# Patient Record
Sex: FEMALE | Race: WHITE | NOT HISPANIC OR LATINO | URBAN - METROPOLITAN AREA
[De-identification: names, ages, dates, MRNs, and addresses within clinical notes are randomized per-mention and may not be internally consistent; named-entity substitution may affect disease eponyms.]

---

## 2017-02-10 ENCOUNTER — ALLSCRIPTS OFFICE VISIT (OUTPATIENT)
Dept: OTHER | Facility: OTHER | Age: 52
End: 2017-02-10

## 2017-02-16 ENCOUNTER — ALLSCRIPTS OFFICE VISIT (OUTPATIENT)
Dept: OTHER | Facility: OTHER | Age: 52
End: 2017-02-16

## 2017-02-16 ENCOUNTER — LAB REQUISITION (OUTPATIENT)
Dept: LAB | Facility: HOSPITAL | Age: 52
End: 2017-02-16
Payer: COMMERCIAL

## 2017-02-16 DIAGNOSIS — Z01.419 ENCOUNTER FOR GYNECOLOGICAL EXAMINATION WITHOUT ABNORMAL FINDING: ICD-10-CM

## 2017-02-16 DIAGNOSIS — Z11.51 ENCOUNTER FOR SCREENING FOR HUMAN PAPILLOMAVIRUS (HPV): ICD-10-CM

## 2017-02-16 DIAGNOSIS — Z12.31 ENCOUNTER FOR SCREENING MAMMOGRAM FOR MALIGNANT NEOPLASM OF BREAST: ICD-10-CM

## 2017-02-16 PROCEDURE — 87624 HPV HI-RISK TYP POOLED RSLT: CPT | Performed by: PHYSICIAN ASSISTANT

## 2017-02-16 PROCEDURE — G0145 SCR C/V CYTO,THINLAYER,RESCR: HCPCS | Performed by: PHYSICIAN ASSISTANT

## 2017-02-21 LAB — HPV RRNA GENITAL QL NAA+PROBE: ABNORMAL

## 2017-02-23 LAB
LAB AP GYN PRIMARY INTERPRETATION: NORMAL
Lab: NORMAL
PATH INTERP SPEC-IMP: NORMAL

## 2017-02-24 ENCOUNTER — GENERIC CONVERSION - ENCOUNTER (OUTPATIENT)
Dept: OTHER | Facility: OTHER | Age: 52
End: 2017-02-24

## 2017-02-28 ENCOUNTER — ALLSCRIPTS OFFICE VISIT (OUTPATIENT)
Dept: OTHER | Facility: OTHER | Age: 52
End: 2017-02-28

## 2017-03-01 ENCOUNTER — ALLSCRIPTS OFFICE VISIT (OUTPATIENT)
Dept: OTHER | Facility: OTHER | Age: 52
End: 2017-03-01

## 2017-03-01 PROCEDURE — 88305 TISSUE EXAM BY PATHOLOGIST: CPT | Performed by: OBSTETRICS & GYNECOLOGY

## 2017-03-02 ENCOUNTER — LAB REQUISITION (OUTPATIENT)
Dept: LAB | Facility: HOSPITAL | Age: 52
End: 2017-03-02
Payer: COMMERCIAL

## 2017-03-02 DIAGNOSIS — A63.0 ANOGENITAL (VENEREAL) WARTS: ICD-10-CM

## 2017-03-02 DIAGNOSIS — R87.612 LOW GRADE SQUAMOUS INTRAEPITHELIAL LESION ON CYTOLOGIC SMEAR OF CERVIX (LGSIL): ICD-10-CM

## 2017-11-06 ENCOUNTER — ALLSCRIPTS OFFICE VISIT (OUTPATIENT)
Dept: OTHER | Facility: OTHER | Age: 52
End: 2017-11-06

## 2017-11-06 DIAGNOSIS — E78.5 HYPERLIPIDEMIA: ICD-10-CM

## 2017-11-06 DIAGNOSIS — R73.01 IMPAIRED FASTING GLUCOSE: ICD-10-CM

## 2017-11-06 DIAGNOSIS — Z12.31 ENCOUNTER FOR SCREENING MAMMOGRAM FOR MALIGNANT NEOPLASM OF BREAST: ICD-10-CM

## 2017-11-06 DIAGNOSIS — E55.9 VITAMIN D DEFICIENCY: ICD-10-CM

## 2017-11-07 NOTE — PROGRESS NOTES
Assessment  1  Acute non-recurrent maxillary sinusitis (461 0) (J01 00)   2  Asthma (493 90) (J45 909)   3  Hyperlipidemia (272 4) (E78 5)   4  IFG (impaired fasting glucose) (790 21) (R73 01)   5  Encounter for screening mammogram for malignant neoplasm of breast (V76 12)   (Z12 31)    Plan  Acute non-recurrent maxillary sinusitis, Asthma    · Amoxicillin-Pot Clavulanate 875-125 MG Oral Tablet (Augmentin); TAKE 1 TABLET  EVERY 12 HOURS WITH MEALS UNTIL GONE   · PredniSONE 10 MG Oral Tablet; take 4 tablets po for 4 days, three tablets for 3  days, 2 tablet for 2 days, 1 tablet for 1 day  Asthma    · Advair Diskus 250-50 MCG/DOSE Inhalation Aerosol Powder Breath Activated; Inhale 1 puff twice daily  Encounter for screening mammogram for malignant neoplasm of breast    · * MAMMO SCREENING BILATERAL W CAD; Status:Hold For - Scheduling; Requested  GMM:98SLH7284;   Hyperlipidemia, IFG (impaired fasting glucose), Vitamin D deficiency    · (1) COMPREHENSIVE METABOLIC PANEL; Status:Active; Requested RGV:40QZQ7417;    · (1) HEMOGLOBIN A1C; Status:Active; Requested FMF:21JGR1439;    · (1) LIPID PANEL FASTING W DIRECT LDL REFLEX; Status:Active; Requested  QFP:27CTI6919;    · (1) TSH WITH FT4 REFLEX; Status:Active; Requested DELORES:93GHU4899;    · (1) VITAMIN D 25-HYDROXY; Status:Active; Requested UGS:[id number];     Chief Complaint  1  Cold Symptoms  Patient here with stuffy head post nasal drip cough green yellow mucous sinus pressure teeth are sensitive left ear crackling fatigue      History of Present Illness  HPI: started 2-3 weeks   Cold Symptoms: Joannie Monday presents with complaints of cold symptoms  Associated symptoms include nasal congestion,-- runny nose,-- sore throat,-- hoarseness,-- productive cough,-- facial pressure,-- facial pain,-- headache,-- plugged ear(s),-- wheezing,-- shortness of breath-- and-- fatigue, but-- no ear pain,-- no nausea,-- no vomiting,-- no diarrhea,-- no fever-- and-- no chills     Asthma (Follow-Up): The patient is being seen for an acute exacerbation of asthma  The patient's long-term asthma pattern has been classified as moderate persistent  The patient states she has been doing poorly with her asthma control since the last visit  She has no significant interval events  Asthma Control: Not well controlled  Interval Symptoms:   worsened wheezing-- and-- worsened coughing  Medications: the patient is adherent with her medication regimen  Hyperlipidemia (Follow-Up): The patient states her hyperlipidemia has been stable since the last visit  She has no significant interval events  Symptoms: The patient is currently asymptomatic  Medications: the patient is adherent with her medication regimen  Review of Systems    Constitutional: as noted in HPI    ENT: as noted in HPI  Cardiovascular: as noted in HPI  Respiratory: as noted in HPI  Breasts: no complaints of breast pain, breast lump or nipple discharge  Gastrointestinal: as noted in HPI  Genitourinary: as noted in HPI  Musculoskeletal: as noted in HPI  Integumentary: no complaints of skin rash or lesion, no itching or dry skin, no skin wounds  Neurological: no complaints of headache, no confusion, no numbness or tingling, no dizziness or fainting  Active Problems  1  Asthma (493 90) (J45 909)   2  Carpal tunnel syndrome, unspecified laterality (354 0) (G56 00)   3  AUBREY I (cervical intraepithelial neoplasia I) (622 11) (N87 0)   4  Depression screen (V79 0) (Z13 89)   5  Encounter for gynecological examination without abnormal finding (V72 31) (Z01 419)   6  Encounter for IUD removal (V25 12) (Z30 432)   7  Encounter for screening colonoscopy (V76 51) (Z12 11)   8  Encounter for screening mammogram for malignant neoplasm of breast (V76 12)   (Z12 31)   9  Esophageal reflux (530 81) (K21 9)   10  High risk HPV infection (079 4) (B97 7)   11  History of allergy (V15 09) (Z88 9)   12   Hyperlipidemia (272 4) (E78 5) 13  IFG (impaired fasting glucose) (790 21) (R73 01)   14  Insomnia (780 52) (G47 00)   15  Low grade squamous intraepithelial lesion (LGSIL) on cervical Pap smear (795 03)    (R87 612)   16  Ptosis of eyelid (374 30) (H02 409)   17  Screening for HPV (human papillomavirus) (V73 81) (Z11 51)   18  Vitamin D deficiency (268 9) (E55 9)    Past Medical History  1  History of Asthma (493 90) (J45 909)   2  History of Dysfunction of Eustachian tube, unspecified laterality (381 81) (H69 80)   3  History of Exposure to STD (V01 6) (Z20 2)   4  History of Hand pain, unspecified laterality   5  History Of 1  Previous Pregnancies (V61 5)   6  History of Lump In The Throat (784 2)   7  History of Muscle spasms of neck (728 85) (M62 838)   8  History of Numbness (782 0) (R20 0)   9  History of Screening for HPV (human papillomavirus) (V73 81) (Z11 51)   10  History of Screening for human papillomavirus (HPV) (V73 81) (Z11 51)   11  History of Shoulder joint pain, unspecified laterality   12  History of Tinea versicolor (111 0) (B36 0)   13  History of TMJ arthralgia (524 62) (M26 629)  Active Problems And Past Medical History Reviewed: The active problems and past medical history were reviewed and updated today  Family History  Mother    1  Family history of myocardial infarction (V17 3) (Z82 49)   2  Family history of Mother  At Age 72  Father    3  Family history of myocardial infarction (V17 3) (Z82 49)   4  Family history of Father  At Age 62  Sister    11  Family history of diabetes mellitus (V18 0) (Z83 3)   6  Family history of Pure Hypercholesterolemia  Brother    7  Family history of coronary artery disease (V17 3) (Z82 49)  Family History Reviewed: The family history was reviewed and updated today  Social History   · Being A Social Drinker   · Daily Coffee Consumption (2  Cups/Day)   · Never a smoker  The social history was reviewed and updated today  Surgical History  1   History of  Section   2  History of Colposcopy Cervix With Biopsy(S) With Endocervical Curettage   3  History of Corneal LASIK Bilateral   4  History of Neuroplasty Decompression Median Nerve At Carpal Tunnel  Surgical History Reviewed: The surgical history was reviewed and updated today  Current Meds   1  Advair Diskus 250-50 MCG/DOSE Inhalation Aerosol Powder Breath Activated; Inhale 1   puff twice daily; Therapy: 30HEX6143 to (Evaluate:2017)  Requested for: 16VYA6363; Last   Rx:26Ijf0477 Ordered   2  Ventolin  (90 Base) MCG/ACT Inhalation Aerosol Solution; INHALE 2 PUFFS   FOUR TIMES DAILY AS DIRECTED; Therapy: 29LRT1879 to (Last Rx:77Jkx9854)  Requested for: 10WIC9067 Ordered    The medication list was reviewed and updated today  Allergies  1  No Known Drug Allergies  2  No Known Food Allergies   3  Seasonal    Vitals   Recorded: 82IWD1986 10:50AM   Temperature 97 8 F   Heart Rate 74   Respiration 18   Systolic 072   Diastolic 80   Height 4 ft 11 5 in   Weight 139 lb 8 oz   BMI Calculated 27 7   BSA Calculated 1 59     Physical Exam    Constitutional   General appearance: No acute distress, well appearing and well nourished  Eyes   Conjunctiva and lids: No swelling, erythema or discharge  Ears, Nose, Mouth, and Throat   External inspection of ears and nose: Normal     Otoscopic examination: Tympanic membranes translucent with normal light reflex  Canals patent without erythema  Nasal mucosa, septum, and turbinates: Normal without edema or erythema  Oropharynx: Abnormal  -- pnd  Pulmonary   Respiratory effort: No increased work of breathing or signs of respiratory distress  Auscultation of lungs: Abnormal   Auscultation of the lungs revealed diffuse wheezing  Cardiovascular   Auscultation of heart: Normal rate and rhythm, normal S1 and S2, without murmurs  Examination of extremities for edema and/or varicosities: Normal     Abdomen   Abdomen: Non-tender, no masses  Liver and spleen: No hepatomegaly or splenomegaly  Lymphatic   Palpation of lymph nodes in neck: No lymphadenopathy  Musculoskeletal   Gait and station: Normal     Skin   Skin and subcutaneous tissue: Normal without rashes or lesions           Future Appointments    Date/Time Provider Specialty Site   02/20/2018 10:00 AM [facility]etrics[facility]Gynecology [facility]     Signatures   Electronically signed by : Ana Thibodeaux DO; Nov 6 2017 11:07AM EST                       (Author)

## 2018-01-09 NOTE — MISCELLANEOUS
Provider Comments  Provider Comments:   Our records indicate that you missed an appointment on 11/08/2016  If you have not done so already, please call our office and we will be happy to schedule another appointment for you  If you must cancel your appointment, our office policy requires you to call our office at least 24 hours in advance so that we may utilize your appointment time for another patient who requires our services  If you have any questions, please contact our office at [phone]        Signatures   Electronically signed by :  Oscar Nassar, ; Nov 11 2016 10:42AM EST                       (Author)

## 2018-01-10 NOTE — PSYCH
History of Present Illness  Psychotherapy Provided [facility]: Individual Psychotherapy 30 minutes minutes provided today  Goals addressed in session:   Patient increasingly anxious aand overwhelmed with financial and potential housing issues  Son nd his girlfriend living with her and not contributing  Feels meds have not been helpful but given level of her stressors it is not surprising  HPI - Psych: Patient detail continued issues with anxiety and mood  Working three jobs and dealing with lack of respect or support from son and girlfriend  May lose home and has limited options to address this issue  Denies any SI   Note   Note:   Reviewed coping strategies for mood and anxiety issues as well as need to hold son accountable  Provided number for [facility] for advocacy/referral to address home and financial issues if possible  Offered to see patient on PRN basis/      Assessment    1   Depression with anxiety (300 4) (F41 8)    Signatures   Electronically signed by : Tashia Kunz LCSW; Feb 28 2017 12:59PM EST                       (Author)

## 2018-01-12 NOTE — PROGRESS NOTES
Assessment    1  Encounter for preventive health examination (V70 0) (Z00 00)   2  Blood pressure elevated (401 9) (I10)   3  Hyperlipidemia (272 4) (E78 5)   4  IFG (impaired fasting glucose) (790 21) (R73 01)   5  Vitamin D deficiency (268 9) (E55 9)   6  Never a smoker    Plan  Asthma    · Advair Diskus 250-50 MCG/DOSE Inhalation Aerosol Powder Breath Activated; Inhale 1 puff twice daily   · Ventolin  (90 Base) MCG/ACT Inhalation Aerosol Solution; INHALE 2  PUFFS FOUR TIMES DAILY AS DIRECTED  Blood pressure elevated, Hyperlipidemia, IFG (impaired fasting glucose), Vitamin D  deficiency    · (1) COMPREHENSIVE METABOLIC PANEL; Status:Active; Requested for:67Wjn9098;    · (1) HEMOGLOBIN A1C; Status:Active; Requested for:14Nwx1798;    · (1) LIPID PANEL, FASTING; Status:Active; Requested for:83Qos6366;    · (1) VITAMIN D 25-HYDROXY; Status:Active; Requested for:04Xnj6084;    Health Maintenance    · Always use a seat belt and shoulder strap when riding or driving a motor vehicle ;  Status:Complete;   Done: 61FIB5994   · Begin a limited exercise program ; Status:Complete;   Done: 93DPE7892   · Brush your teeth freq1 and floss at least once a day ; Status:Complete;   Done:  83DDO5712   · Drink plenty of fluids ; Status:Complete;   Done: 64FSD0601   · Eat a low fat and low cholesterol diet ; Status:Complete;   Done: 17JWG4411   · Use a sun block product with an SPF of 15 or more ; Status:Complete;   Done:  14LVG5818   · We recommend routine visits to a dentist ; Status:Complete;   Done: 18HSF6227   · We recommend that you get 400 IU of Vitamin D each day ; Status:Complete;   Done:  69YJC1452   · Call [phone] if: You find a new or different kind of lump in your breast ;  Status:Complete;   Done: 23QPM8496   · Call [phone] if: You have any bleeding from the vagina ; Status:Complete;    Done: 43YOP2412  Health Maintenance, Encounter for screening mammogram for malignant neoplasm of  breast    · * MAMMO SCREENING BILATERAL W CAD; Status:Hold For - Scheduling; Requested  for:54Ndv2470;     depression doing well without taking any medications at this time  will continue to monitor     Discussion/Summary  health maintenance visit Currently, she eats an adequate diet  Chief Complaint  Patient here for annual wellness exam      History of Present Illness  HM, Adult Female: The patient is being seen for a health maintenance evaluation  The last health maintenance visit was 2 year(s) ago  General Health: The patient's health since the last visit is described as good  She has regular dental visits  She denies vision problems  Vision care includes wearing reading glasses and previous LASIK eye surgery  She denies hearing loss  Immunizations status: up to date  Lifestyle:  She consumes a diverse and healthy diet  She does not have any weight concerns  She exercises regularly  She does not use tobacco  She consumes alcohol  She denies drug use  Reproductive health:  she reports normal menses  she uses contraception  For contraception, she has an intrauterine device  she is sexually active  pregnancy history: G 1P 1  Screening: cancer screening reviewed and updated  metabolic screening reviewed and updated  risk screening reviewed and updated  Review of Systems    Constitutional: No fever, no chills, feels well, no tiredness, no recent weight gain or weight loss  Eyes: No complaints of eye pain, no red eyes, no eyesight problems, no discharge, no dry eyes, no itching of eyes  ENT: no complaints of earache, no loss of hearing, no nose bleeds, no nasal discharge, no sore throat, no hoarseness  Cardiovascular: No complaints of slow heart rate, no fast heart rate, no chest pain, no palpitations, no leg claudication, no lower extremity edema  Respiratory: No complaints of shortness of breath, no wheezing, no cough, no SOB on exertion, no orthopnea, no PND     Gastrointestinal: No complaints of abdominal pain, no constipation, no nausea or vomiting, no diarrhea, no bloody stools  Genitourinary: No complaints of dysuria, no incontinence, no pelvic pain, no dysmenorrhea, no vaginal discharge or bleeding  Musculoskeletal: No complaints of arthralgias, no myalgias, no joint swelling or stiffness, no limb pain or swelling  Integumentary: No complaints of skin rash or lesions, no itching, no skin wounds, no breast pain or lump  Neurological: No complaints of headache, no confusion, no convulsions, no numbness, no dizziness or fainting, no tingling, no limb weakness, no difficulty walking  Psychiatric: Not suicidal, no sleep disturbance, no anxiety or depression, no change in personality, no emotional problems  Endocrine: No complaints of proptosis, no hot flashes, no muscle weakness, no deepening of the voice, no feelings of weakness  Hematologic/Lymphatic: No complaints of swollen glands, no swollen glands in the neck, does not bleed easily, does not bruise easily  Active Problems    1  Ankle injury (959 7) (S99 919A)   2  Asthma (493 90) (J45 909)   3  Blood pressure elevated (401 9) (I10)   4  Carpal tunnel syndrome, unspecified laterality (354 0) (G56 00)   5  Depression with anxiety (300 4) (F41 8)   6  Encounter for routine gynecological examination (V72 31) (Z01 419)   7  Encounter for routine gynecological examination with Papanicolaou smear of cervix   (V72 31,V76 2) (Z01 419)   8  Encounter for screening colonoscopy (V76 51) (Z12 11)   9  Encounter for screening mammogram for malignant neoplasm of breast (V76 12)   (Z12 31)   10  Esophageal reflux (530 81) (K21 9)   11  Headache (784 0) (R51)   12  History of allergy (V15 09) (Z88 9)   13  Hyperlipidemia (272 4) (E78 5)   14  IFG (impaired fasting glucose) (790 21) (R73 01)   15  Insomnia (780 52) (G47 00)   16  Ptosis of eyelid (374 30) (H02 409)   17  Screening for HPV (human papillomavirus) (V73 81) (Z11 51)   18   Vitamin D deficiency (268 9) (E55 9)    Past Medical History    · History of Asthma (493 90) (J45 909)   · History of Dysfunction of eustachian tube, unspecified laterality (381 81) (H69 80)   · History of Exposure to STD (V01 6) (Z20 2)   · History of Hand pain, unspecified laterality   · History Of 1  Previous Pregnancies (V61 5)   · History of Lump In The Throat (784 2)   · History of Muscle spasms of neck (728 85) (M62 838)   · History of Numbness (782 0) (R20 0)   · History of Screening for human papillomavirus (HPV) (V73 81) (Z11 51)   · History of Shoulder joint pain, unspecified laterality   · History of Tinea versicolor (111 0) (B36 0)   · History of TMJ arthralgia (524 62) (M26 62)    Surgical History    · History of  Section   · History of Corneal LASIK Bilateral    Family History  Mother    · Family history of Mother  At Age 72  Father    · Family history of Father  At Age 62  Sister    · Family history of Pure Hypercholesterolemia  Family History    · Family history of Coronary Artery Disease (V17 49)    Social History    · Being A Social Drinker   · Daily Coffee Consumption (2  Cups/Day)   · Never a smoker    Current Meds   1  Advair Diskus 250-50 MCG/DOSE Inhalation Aerosol Powder Breath Activated; USE   ONE INHALATION TWO TIMES A DAY; Therapy: 36AXU7188 to (Evaluate:20Brv7518)  Requested for: 89Yhx6957; Last   Rx:54Lso3693 Ordered   2  Mirena 20 MCG/24HR Intrauterine Intrauterine Device; USE AS DIRECTED; Therapy: 57SZV0065 to Recorded   3  Ventolin  (90 Base) MCG/ACT Inhalation Aerosol Solution; INHALE 2 PUFFS 4   TIMES DAILY AS NEEDED; Therapy: 61VVQ4996 to (Last Rx:34Jko4371)  Requested for: 74XZB8879 Ordered    Allergies    1  No Known Drug Allergies    2  No Known Food Allergies   3   Seasonal    Vitals   Recorded: 38BGA1171 56:25YS   Systolic 155   Diastolic 82   Heart Rate 52   Respiration 16   Height 5 ft 0 24 in   Weight 133 lb 4 oz   BMI Calculated 25 82   BSA Calculated 1 58 Physical Exam    Constitutional   General appearance: No acute distress, well appearing and well nourished  Head and Face   Head and face: Normal     Palpation of the face and sinuses: No sinus tenderness  Eyes   Conjunctiva and lids: No swelling, erythema or discharge  Pupils and irises: Equal, round, reactive to light  Ophthalmoscopic examination: Normal fundi and optic discs  Ears, Nose, Mouth, and Throat   External inspection of ears and nose: Normal     Otoscopic examination: Tympanic membranes translucent with normal light reflex  Canals patent without erythema  Hearing: Normal     Nasal mucosa, septum, and turbinates: Normal without edema or erythema  Lips, teeth, and gums: Normal, good dentition  Oropharynx: Normal with no erythema, edema, exudate or lesions  Neck   Neck: Supple, symmetric, trachea midline, no masses  Thyroid: Normal, no thyromegaly  Pulmonary   Respiratory effort: No increased work of breathing or signs of respiratory distress  Percussion of chest: Normal     Auscultation of lungs: Clear to auscultation  Cardiovascular   Palpation of heart: Normal PMI, no thrills  Auscultation of heart: Normal rate and rhythm, normal S1 and S2, no murmurs  Examination of extremities for edema and/or varicosities: Normal     Chest   Chest: Normal     Abdomen   Abdomen: Non-tender, no masses  Liver and spleen: No hepatomegaly or splenomegaly  Examination for hernias: No hernia appreciated  Lymphatic   Palpation of lymph nodes in neck: No lymphadenopathy  Palpation of lymph nodes in axillae: No lymphadenopathy  Palpation of lymph nodes in groin: No lymphadenopathy  Musculoskeletal   Gait and station: Normal     Digits and nails: Normal without clubbing or cyanosis      Joints, bones, and muscles: Normal     Range of motion: Normal     Stability: Normal     Muscle strength/tone: Normal     Skin   Skin and subcutaneous tissue: Normal without rashes or lesions  Palpation of skin and subcutaneous tissue: Normal turgor  Neurologic   Cranial nerves: Cranial nerves II-XII intact  Cortical function: Normal mental status  Reflexes: 2+ and symmetric  Sensation: No sensory loss  Coordination: Normal finger to nose and heel to shin  Psychiatric   Judgment and insight: Normal     Orientation to person, place, and time: Normal     Recent and remote memory: Intact  Mood and affect: Normal        Results/Data  PHQ-9 Adult Depression Screening 65Nkw2638 02:14PM User, [employer]     Test Name Result Flag Reference   PHQ-9 Adult Depression Score 22     Over the last two weeks, how often have you been bothered by any of the following problems? Little interest or pleasure in doing things: More than half the days - 2  Feeling down, depressed, or hopeless: Nearly every day - 3  Trouble falling or staying asleep, or sleeping too much: Nearly every day - 3  Feeling tired or having little energy: Nearly every day - 3  Poor appetite or over eating: Nearly every day - 3  Feeling bad about yourself - or that you are a failure or have let yourself or your family down: Nearly every day - 3  Trouble concentrating on things, such as reading the newspaper or watching television: Several days - 1  Moving or speaking so slowly that other people could have noticed  Or the opposite -  being so fidgety or restless that you have been moving around a lot more than usual: Nearly every day - 3  Thoughts that you would be better off dead, or of hurting yourself in some way: Several days - 1   PHQ-9 Adult Depression Screening Positive     PHQ-9 Difficulty Level Not difficult at all     PHQ-9 Severity Severe Depression         Health Management  Encounter for routine gynecological examination   BREAST EXAM; every 1 year; Next Due: 21Nov2013; Overdue  PELVIC EXAM; every 1 year; Next Due: 21Nov2013;  Overdue    Future Appointments    Date/Time Provider Specialty Site   11/08/2016 09:30 AM Cherie Ortiz,  Family Medicine [facility] [address]   Electronically signed by : Ashia Holt MD; Aug  8 2016  3:10PM EST                       (Author)

## 2018-01-12 NOTE — RESULT NOTES
Verified Results  (1) LIPID PANEL, FASTING 09Aug2016 03:32PM Pau Stephens     Test Name Result Flag Reference   CHOLESTEROL, TOTAL 218 mg/dL H 125-200   HDL CHOLESTEROL 51 mg/dL  > OR = 46   TRIGLICERIDES 556 mg/dL  <150   LDL-CHOLESTEROL 146 mg/dL (calc) H <130   Desirable range <100 mg/dL for patients with CHD or  diabetes and <70 mg/dL for diabetic patients with  known heart disease  CHOL/HDLC RATIO 4 3 (calc)  < OR = 5 0   NON HDL CHOLESTEROL 167 mg/dL (calc) H    Target for non-HDL cholesterol is 30 mg/dL higher than   LDL cholesterol target  (1) COMPREHENSIVE METABOLIC PANEL [id number] 80:05LQ Pau Stephens     Test Name Result Flag Reference   GLUCOSE 84 mg/dL  65-99   Fasting reference interval   UREA NITROGEN (BUN) 12 mg/dL  7-25   CREATININE 0 87 mg/dL  0 50-1 05   For patients >52years of age, the reference limit  for Creatinine is approximately 13% higher for people  identified as -American  eGFR NON-AFR   AMERICAN 77 mL/min/1 73m2  > OR = 60   eGFR AFRICAN AMERICAN 89 mL/min/1 73m2  > OR = 60   BUN/CREATININE RATIO   3-83   NOT APPLICABLE (calc)   SODIUM 138 mmol/L  135-146   POTASSIUM 4 2 mmol/L  3 5-5 3   CHLORIDE 102 mmol/L     CARBON DIOXIDE 26 mmol/L  20-31   CALCIUM 9 8 mg/dL  8 6-10 4   PROTEIN, TOTAL 7 1 g/dL  6 1-8 1   ALBUMIN 4 6 g/dL  3 6-5 1   GLOBULIN 2 5 g/dL (calc)  1 9-3 7   ALBUMIN/GLOBULIN RATIO 1 8 (calc)  1 0-2 5   BILIRUBIN, TOTAL 1 2 mg/dL  0 2-1 2   ALKALINE PHOSPHATASE 116 U/L     AST 15 U/L  10-35   ALT 9 U/L  6-29     *(Q) VITAMIN D, 25-HYDROXY, LC/MS/MS 09Aug2016 03:32PPau Lazar     Test Name Result Flag Reference   VITAMIN D, 25-OH, TOTAL 52 ng/mL     Vitamin D Status         25-OH Vitamin D:     Deficiency:                    <20 ng/mL  Insufficiency:             20 - 29 ng/mL  Optimal:                 > or = 30 ng/mL     For 25-OH Vitamin D testing on patients on   D2-supplementation and patients for whom quantitation   of D2 and D3 fractions is required, the QuestAssureD(TM)  25-OH VIT D, (D2,D3), LC/MS/MS is recommended: order   code 95014 (patients >2yrs)  For more information on this test, go to:  http://education  ReferStar/faq/EIH115  (This link is being provided for   informational/educational purposes only )     (Q) HEMOGLOBIN A1c [id number] 03:32PM Pau Stephens   REPORT COMMENT:  FASTING:YES     Test Name Result Flag Reference   HEMOGLOBIN A1c 6 1 % of total Hgb H <5 7   According to ADA guidelines, hemoglobin A1c <7 0%  represents optimal control in non-pregnant diabetic  patients  Different metrics may apply to specific  patient populations  Standards of Medical Care in  [phone]  Diabetes Care  2013;36:s11-s66     For the purpose of screening for the presence of  diabetes  <5 7%       Consistent with the absence of diabetes  5 7-6 4%    Consistent with increased risk for diabetes              (prediabetes)  >or=6 5%    Consistent with diabetes     This assay result is consistent with a higher risk  of diabetes  Currently, no consensus exists for use of hemoglobin  A1c for diagnosis of diabetes for children         Discussion/Summary   bad cholesterol LDL is high   watch sweets and fatty food   blood sugars are better but still in Pre-diabetic range   - Dr Ray Freeman

## 2018-01-13 VITALS
SYSTOLIC BLOOD PRESSURE: 118 MMHG | HEIGHT: 60 IN | WEIGHT: 128 LBS | BODY MASS INDEX: 25.13 KG/M2 | DIASTOLIC BLOOD PRESSURE: 72 MMHG

## 2018-01-13 VITALS
BODY MASS INDEX: 27.39 KG/M2 | HEART RATE: 74 BPM | TEMPERATURE: 97.8 F | SYSTOLIC BLOOD PRESSURE: 122 MMHG | DIASTOLIC BLOOD PRESSURE: 80 MMHG | HEIGHT: 60 IN | WEIGHT: 139.5 LBS | RESPIRATION RATE: 18 BRPM

## 2018-01-14 VITALS
SYSTOLIC BLOOD PRESSURE: 126 MMHG | BODY MASS INDEX: 25.64 KG/M2 | WEIGHT: 130.6 LBS | DIASTOLIC BLOOD PRESSURE: 76 MMHG | RESPIRATION RATE: 16 BRPM | HEIGHT: 60 IN | HEART RATE: 76 BPM

## 2018-01-14 VITALS
SYSTOLIC BLOOD PRESSURE: 120 MMHG | DIASTOLIC BLOOD PRESSURE: 74 MMHG | HEIGHT: 60 IN | WEIGHT: 124 LBS | BODY MASS INDEX: 24.35 KG/M2

## 2018-01-14 NOTE — MISCELLANEOUS
Message  Return to work or school:   Letty Root is under my professional care  She was seen in my office on 11/06/2017 11/09/2017      PATIENT WAS SEEN IN OUR OFFICE 11/06/2017 YESSICA RETURN TO WORK 11/09/2017  DR Walter Irizarry / Jimmie Khan  Signatures   Electronically signed by :  Drew Sarabia, ; Nov 6 2017 11:16AM EST                       (Author)

## 2018-01-17 NOTE — MISCELLANEOUS
Message   Recorded as Task   Date: 02/24/2017 08:25 AM, Created By: Jhonny Pace   Task Name: Go to Result   Assigned To: Will Angeles   Regarding Patient: Shaniqua Higgins, Status: In Progress   Comment:    Linda Esparza - 24 Feb 2017 8:25 AM     TASK CREATED  Pap LGSIL, positive HPV 18  Needs colpo  Please let her know and schedule  Ami Kaylyn Donald - 24 Feb 2017 8:54 AM     TASK IN PROGRESS   Kaylyn Wan - 24 Feb 2017 9:14 AM     TASK EDITED  explained pap to pt  colpo sched  03/01 with vg        Active Problems    1  Asthma (493 90) (J45 909)   2  Carpal tunnel syndrome, unspecified laterality (354 0) (G56 00)   3  Depression screen (V79 0) (Z13 89)   4  Depression with anxiety (300 4) (F41 8)   5  Encounter for gynecological examination without abnormal finding (V72 31) (Z01 419)   6  Encounter for IUD removal (V25 12) (Z30 432)   7  Encounter for screening colonoscopy (V76 51) (Z12 11)   8  Encounter for screening mammogram for malignant neoplasm of breast (V76 12)   (Z12 31)   9  Esophageal reflux (530 81) (K21 9)   10  Headache (784 0) (R51)   11  History of allergy (V15 09) (Z88 9)   12  Hyperlipidemia (272 4) (E78 5)   13  IFG (impaired fasting glucose) (790 21) (R73 01)   14  Insomnia (780 52) (G47 00)   15  Ptosis of eyelid (374 30) (H02 409)   16  Screening for HPV (human papillomavirus) (V73 81) (Z11 51)   17  Vitamin D deficiency (268 9) (E55 9)    Current Meds   1  Advair Diskus 250-50 MCG/DOSE Inhalation Aerosol Powder Breath Activated; Inhale 1   puff twice daily; Therapy: 73IJD4893 to (Evaluate:94Ncg9356)  Requested for: 24AZQ7868; Last   Rx:51Wlk0997 Ordered   2  BusPIRone HCl - 7 5 MG Oral Tablet; take 1 tablet every twelve hours; Therapy: 09GOX1294 to (Evaluate:52Chx3782)  Requested for: 97DMZ4016; Last   Rx:82Pdd6324 Ordered   3  HydrOXYzine HCl - 50 MG Oral Tablet; TAKE 1 TABLET AT BEDTIME;    Therapy: 81LKM8787 to (Misbah Zendejas)  Requested for: 89DLA9747; Last   Rx:33Nfr7429 Ordered   4  Mirena (52 MG) 20 MCG/24HR Intrauterine Intrauterine Device; USE AS DIRECTED; Therapy: 84KFA2377 to Recorded   5  Promethazine-DM 6 25-15 MG/5ML Oral Syrup; TAKE 5 ML EVERY 4 TO 6 HOURS AS   NEEDED FOR COUGH; Therapy: 80XDZ4185 to (Evaluate:36Oir9922)  Requested for: 68PWW8647; Last   Rx:75Urq9790 Ordered   6  Sertraline HCl - 50 MG Oral Tablet; take 1 tablet by mouth daily as directed; Therapy: 71PZO5385 to (Evaluate:63Tmc2711)  Requested for: 73ZLM8094; Last   Rx:10Feb2017 Ordered   7  Ventolin  (90 Base) MCG/ACT Inhalation Aerosol Solution; INHALE 2 PUFFS   FOUR TIMES DAILY AS DIRECTED; Therapy: 55ZMM4656 to (Last Rx:10Feb2017)  Requested for: 42ORP1880 Ordered    Allergies    1  No Known Drug Allergies    2  No Known Food Allergies   3   Seasonal    Signatures   Electronically signed by : Eriberto Mcmanus, ; Feb 24 2017  9:14AM EST                       (Author)

## 2018-02-07 PROBLEM — R87.612 LOW GRADE SQUAMOUS INTRAEPITHELIAL LESION (LGSIL) ON CERVICAL PAP SMEAR: Status: ACTIVE | Noted: 2017-03-01

## 2018-02-07 PROBLEM — B97.7 HIGH RISK HPV INFECTION: Status: ACTIVE | Noted: 2017-03-01

## 2018-02-07 PROBLEM — N87.0 CIN I (CERVICAL INTRAEPITHELIAL NEOPLASIA I): Status: ACTIVE | Noted: 2017-03-09

## 2018-02-07 RX ORDER — ALBUTEROL SULFATE 90 UG/1
2 AEROSOL, METERED RESPIRATORY (INHALATION) 4 TIMES DAILY PRN
COMMUNITY
Start: 2014-07-14 | End: 2018-10-30 | Stop reason: ALTCHOICE

## 2018-02-07 RX ORDER — BUDESONIDE AND FORMOTEROL FUMARATE DIHYDRATE 80; 4.5 UG/1; UG/1
2 AEROSOL RESPIRATORY (INHALATION) 2 TIMES DAILY
COMMUNITY
Start: 2017-11-07 | End: 2018-10-30 | Stop reason: ALTCHOICE

## 2018-02-19 ENCOUNTER — TELEPHONE (OUTPATIENT)
Dept: OBGYN CLINIC | Facility: CLINIC | Age: 53
End: 2018-02-19

## 2018-10-30 ENCOUNTER — OFFICE VISIT (OUTPATIENT)
Dept: FAMILY MEDICINE CLINIC | Facility: CLINIC | Age: 53
End: 2018-10-30

## 2018-10-30 VITALS
SYSTOLIC BLOOD PRESSURE: 122 MMHG | BODY MASS INDEX: 29.35 KG/M2 | WEIGHT: 147.8 LBS | DIASTOLIC BLOOD PRESSURE: 76 MMHG | HEART RATE: 70 BPM | RESPIRATION RATE: 16 BRPM | OXYGEN SATURATION: 98 % | TEMPERATURE: 98.6 F

## 2018-10-30 DIAGNOSIS — J45.40 MODERATE PERSISTENT ASTHMA WITHOUT COMPLICATION: Primary | Chronic | ICD-10-CM

## 2018-10-30 DIAGNOSIS — F41.9 ANXIETY: Chronic | ICD-10-CM

## 2018-10-30 PROCEDURE — 99212 OFFICE O/P EST SF 10 MIN: CPT | Performed by: FAMILY MEDICINE

## 2018-10-30 RX ORDER — ALBUTEROL SULFATE 90 UG/1
2 AEROSOL, METERED RESPIRATORY (INHALATION) EVERY 6 HOURS PRN
Qty: 18 G | Refills: 5 | Status: SHIPPED | OUTPATIENT
Start: 2018-10-30 | End: 2019-08-29 | Stop reason: SDUPTHER

## 2018-10-30 NOTE — PROGRESS NOTES
Assessment/Plan:    No problem-specific Assessment & Plan notes found for this encounter  Diagnoses and all orders for this visit:    Moderate persistent asthma without complication  Comments:  Stable at this time  PRN Albuterol HFA refilled  Pt given samples of Symbicort 80/4 5mcg 2 puffs BID today  Pt urged to f/u with someone, in next 2 - 3 months  Orders:  -     albuterol (VENTOLIN HFA) 90 mcg/act inhaler; Inhale 2 puffs every 6 (six) hours as needed for wheezing    Anxiety  Comments:  Starting pt back on Sertraline - also referred to [facility] Xiao  Pt urged to f/u with a provider in the next 2 - 3 months  Disc potential R/SE of med  Orders:  -     sertraline (ZOLOFT) 50 mg tablet; Take 1 tablet (50 mg total) by mouth daily  -     Ambulatory referral to [facility] Xiao; Future          Subjective:      Patient ID: Richard Mohr is a 46 y o  female  Pt has not filled her asthma meds in about a year  Cost has been an issue for her, as she does not have health insurance  Pt agitated and upset coming into exam room today, and on evaluation  "[facility] fired me 2 years ago," the pt reported  She also went on to say today, "I think I'm leaving this practice "  She has an ongoing cough, no fevers, no sinus pain  Her anxiety has also been bad for her - on a daily basis  No HI/SI  She is interested in therapy / counseling  Reports doing well with / tolerating Sertraline in the past         The following portions of the patient's history were reviewed and updated as appropriate: allergies, current medications, past family history, past social history, past surgical history and problem list     Review of Systems   Constitutional: Negative for activity change and fever  HENT: Negative for congestion  Respiratory: Positive for cough  Psychiatric/Behavioral: Negative for dysphoric mood and suicidal ideas  The patient is nervous/anxious            Objective:      /76 (BP Location: Left arm, Patient Position: Sitting, Cuff Size: Standard)   Pulse 70   Temp 98 6 °F (37 °C) (Tympanic)   Resp 16   Wt 67 kg (147 lb 12 8 oz)   SpO2 98%   BMI 29 35 kg/m²          Physical Exam   Constitutional: She is oriented to person, place, and time  She appears well-developed and well-nourished  No distress  HENT:   Head: Normocephalic and atraumatic  Right Ear: Hearing, tympanic membrane, external ear and ear canal normal    Left Ear: Hearing, tympanic membrane, external ear and ear canal normal    Mouth/Throat: Oropharynx is clear and moist  No oropharyngeal exudate  Eyes: Conjunctivae are normal    Neck: Normal range of motion  Neck supple  No thyromegaly present  Cardiovascular: Normal rate, regular rhythm and normal heart sounds  Exam reveals no gallop and no friction rub  No murmur heard  Pulmonary/Chest: Effort normal and breath sounds normal  No respiratory distress  She has no wheezes  She has no rales  Lymphadenopathy:     She has no cervical adenopathy  Neurological: She is alert and oriented to person, place, and time  Skin: She is not diaphoretic  Psychiatric: Her behavior is normal  Judgment and thought content normal  Her mood appears anxious  Her speech is tangential    Irritable  Nursing note and vitals reviewed

## 2019-08-29 ENCOUNTER — OFFICE VISIT (OUTPATIENT)
Dept: FAMILY MEDICINE CLINIC | Facility: CLINIC | Age: 54
End: 2019-08-29

## 2019-08-29 VITALS
SYSTOLIC BLOOD PRESSURE: 120 MMHG | HEART RATE: 58 BPM | WEIGHT: 128.2 LBS | DIASTOLIC BLOOD PRESSURE: 80 MMHG | HEIGHT: 60 IN | OXYGEN SATURATION: 97 % | RESPIRATION RATE: 12 BRPM | BODY MASS INDEX: 25.17 KG/M2

## 2019-08-29 DIAGNOSIS — Z12.39 SCREENING FOR BREAST CANCER: ICD-10-CM

## 2019-08-29 DIAGNOSIS — Z00.00 ANNUAL PHYSICAL EXAM: Primary | ICD-10-CM

## 2019-08-29 DIAGNOSIS — J45.40 MODERATE PERSISTENT ASTHMA WITHOUT COMPLICATION: Chronic | ICD-10-CM

## 2019-08-29 DIAGNOSIS — E78.2 MIXED HYPERLIPIDEMIA: ICD-10-CM

## 2019-08-29 PROCEDURE — 99396 PREV VISIT EST AGE 40-64: CPT | Performed by: FAMILY MEDICINE

## 2019-08-29 PROCEDURE — 3725F SCREEN DEPRESSION PERFORMED: CPT | Performed by: FAMILY MEDICINE

## 2019-08-29 RX ORDER — ALBUTEROL SULFATE 90 UG/1
2 AEROSOL, METERED RESPIRATORY (INHALATION) EVERY 6 HOURS PRN
Qty: 18 G | Refills: 5 | Status: SHIPPED | OUTPATIENT
Start: 2019-08-29 | End: 2020-10-07

## 2019-08-29 NOTE — PROGRESS NOTES
ADULT ANNUAL PHYSICAL  [facility] - [facility]    NAME: Marianne Boyer  AGE: 53 y o  SEX: female  : 1965     DATE: 2019     Assessment and Plan:     Problem List Items Addressed This Visit        Respiratory    Asthma    Relevant Medications    albuterol (VENTOLIN HFA) 90 mcg/act inhaler       Other    Hyperlipidemia     Had labs done in april         Annual physical exam - Primary     No insurance  Ordered mammogram         Screening for breast cancer    Relevant Orders    Mammo screening bilateral w cad          Immunizations and preventive care screenings were discussed with patient today  Appropriate education was printed on patient's after visit summary  Counseling:  · BMI Counseling: Body mass index is 25 1 kg/m²  Discussed the patient's BMI with her  The BMI is above average  BMI counseling and education was provided to the patient  Nutrition recommendations include reducing portion sizes and 3-5 servings of fruits/vegetables daily  Exercise recommendations include exercising 3-5 times per week  Return in 1 year (on 2020)  Chief Complaint:     Chief Complaint   Patient presents with    Physical Exam     pt here for wellness      History of Present Illness:     Adult Annual Physical   Patient here for a comprehensive physical exam  The patient reports problems - some asthma issues, using rescue as needed  Diet and Physical Activity  · Diet/Nutrition: well balanced diet  · Exercise: walking  Depression Screening  PHQ-9 Depression Screening    PHQ-9:    Frequency of the following problems over the past two weeks:       Little interest or pleasure in doing things:  0 - not at all  Feeling down, depressed, or hopeless:  0 - not at all  PHQ-2 Score:  0       General Health  · Sleep: sleeps well  · Hearing: normal - bilateral   · Vision: no vision problems, most recent eye exam <1 year ago and wears glasses     · Dental: regular dental visits and brushes teeth twice daily  /GYN Health  · Patient is: postmenopausal  · Last menstrual period: n/a  · Contraceptive method: n/a  Review of Systems:     Review of Systems   Constitutional: Negative  HENT: Negative  Eyes: Negative  Respiratory: Negative  Cardiovascular: Negative  Gastrointestinal: Negative  Endocrine: Negative  Genitourinary: Negative  Musculoskeletal: Negative  Skin: Negative  Allergic/Immunologic: Negative  Neurological: Negative  Hematological: Negative  Psychiatric/Behavioral: Negative         Past Medical History:     Past Medical History:   Diagnosis Date    Asthma     Dysfunction of eustachian tube     Exposure to STD     Last assessed: 2013    Lump in throat     Last assessed: 2014    Neck muscle spasm     Last assessed: 3/29/2013    Pregnancy     TV (tinea versicolor)     Last Assessed: 2013      Past Surgical History:     Past Surgical History:   Procedure Laterality Date     SECTION      son     COLPOSCOPY VULVA W/ BIOPSY  2017    COLPO- AUBREY 1/ HPV    LASIK Bilateral     NEUROPLASTY / TRANSPOSITION MEDIAN NERVE AT CARPAL TUNNEL Left       Social History:     Social History     Socioeconomic History    Marital status: /Civil Union     Spouse name: Not on file    Number of children: Not on file    Years of education: Not on file    Highest education level: Not on file   Occupational History    Not on file   Social Needs    Financial resource strain: Not on file    Food insecurity:     Worry: Not on file     Inability: Not on file    Transportation needs:     Medical: Not on file     Non-medical: Not on file   Tobacco Use    Smoking status: Never Smoker   Substance and Sexual Activity    Alcohol use: Yes     Comment: Being A Social Drinker     Drug use: Not on file    Sexual activity: Not on file   Lifestyle    Physical activity:     Days per week: Not on file     Minutes per session: Not on file    Stress: Not on file   Relationships    Social connections:     Talks on phone: Not on file     Gets together: Not on file     Attends Shinto service: Not on file     Active member of club or organization: Not on file     Attends meetings of clubs or organizations: Not on file     Relationship status: Not on file    Intimate partner violence:     Fear of current or ex partner: Not on file     Emotionally abused: Not on file     Physically abused: Not on file     Forced sexual activity: Not on file   Other Topics Concern    Not on file   Social History Narrative    Per [employer]:     Daily coffee consumption 2 cups/day      Family History:     Family History   Problem Relation Age of Onset    Heart attack Mother     Heart attack Father     Diabetes Sister     Hyperlipidemia Sister     Coronary artery disease Brother       Current Medications:     Current Outpatient Medications   Medication Sig Dispense Refill    albuterol (VENTOLIN HFA) 90 mcg/act inhaler Inhale 2 puffs every 6 (six) hours as needed for wheezing 18 g 5     No current facility-administered medications for this visit  Allergies: Allergies   Allergen Reactions    Pollen Extract       Physical Exam:     /80 (BP Location: Left arm, Patient Position: Sitting, Cuff Size: Standard)   Pulse 58   Resp 12   Ht 4' 11 92" (1 522 m)   Wt 58 2 kg (128 lb 3 2 oz)   SpO2 97%   BMI 25 10 kg/m²     Physical Exam   Constitutional: She is oriented to person, place, and time  Vital signs are normal  She appears well-developed and well-nourished  HENT:   Head: Normocephalic and atraumatic  Nose: Nose normal    Mouth/Throat: Oropharynx is clear and moist    Eyes: Pupils are equal, round, and reactive to light  Conjunctivae, EOM and lids are normal    Neck: Normal range of motion  Neck supple  Cardiovascular: Normal rate, regular rhythm, S1 normal, S2 normal, normal heart sounds and intact distal pulses  Pulmonary/Chest: Effort normal and breath sounds normal    Abdominal: Soft  Bowel sounds are normal    Musculoskeletal: Normal range of motion  Neurological: She is alert and oriented to person, place, and time  She has normal strength and normal reflexes  Skin: Skin is warm, dry and intact  Psychiatric: She has a normal mood and affect  Her speech is normal and behavior is normal  Judgment and thought content normal  Cognition and memory are normal    Nursing note and vitals reviewed        Tess Salinas,   [address] [facility]

## 2019-08-29 NOTE — PATIENT INSTRUCTIONS

## 2019-10-11 ENCOUNTER — TELEPHONE (OUTPATIENT)
Dept: FAMILY MEDICINE CLINIC | Facility: CLINIC | Age: 54
End: 2019-10-11

## 2020-06-04 ENCOUNTER — TELEPHONE (OUTPATIENT)
Dept: FAMILY MEDICINE CLINIC | Facility: CLINIC | Age: 55
End: 2020-06-04

## 2020-06-08 ENCOUNTER — OFFICE VISIT (OUTPATIENT)
Dept: FAMILY MEDICINE CLINIC | Facility: CLINIC | Age: 55
End: 2020-06-08

## 2020-06-08 VITALS
TEMPERATURE: 98.3 F | SYSTOLIC BLOOD PRESSURE: 124 MMHG | OXYGEN SATURATION: 97 % | WEIGHT: 137.8 LBS | RESPIRATION RATE: 16 BRPM | BODY MASS INDEX: 27.78 KG/M2 | HEIGHT: 59 IN | HEART RATE: 80 BPM | DIASTOLIC BLOOD PRESSURE: 70 MMHG

## 2020-06-08 DIAGNOSIS — F43.21 SITUATIONAL DEPRESSION: Primary | ICD-10-CM

## 2020-06-08 PROCEDURE — 99213 OFFICE O/P EST LOW 20 MIN: CPT | Performed by: FAMILY MEDICINE

## 2020-06-08 PROCEDURE — 3008F BODY MASS INDEX DOCD: CPT | Performed by: FAMILY MEDICINE

## 2020-10-07 DIAGNOSIS — J45.40 MODERATE PERSISTENT ASTHMA WITHOUT COMPLICATION: Chronic | ICD-10-CM

## 2020-10-08 RX ORDER — ALBUTEROL SULFATE 90 UG/1
AEROSOL, METERED RESPIRATORY (INHALATION)
Qty: 1 INHALER | Refills: 5 | Status: SHIPPED | OUTPATIENT
Start: 2020-10-08 | End: 2021-07-26 | Stop reason: SDUPTHER

## 2021-07-04 DIAGNOSIS — F43.21 SITUATIONAL DEPRESSION: ICD-10-CM

## 2021-07-26 ENCOUNTER — OFFICE VISIT (OUTPATIENT)
Dept: FAMILY MEDICINE CLINIC | Facility: CLINIC | Age: 56
End: 2021-07-26

## 2021-07-26 VITALS
WEIGHT: 145.8 LBS | SYSTOLIC BLOOD PRESSURE: 124 MMHG | DIASTOLIC BLOOD PRESSURE: 60 MMHG | RESPIRATION RATE: 16 BRPM | OXYGEN SATURATION: 96 % | HEART RATE: 85 BPM | BODY MASS INDEX: 28.62 KG/M2 | HEIGHT: 60 IN

## 2021-07-26 DIAGNOSIS — R20.2 NUMBNESS AND TINGLING IN RIGHT HAND: ICD-10-CM

## 2021-07-26 DIAGNOSIS — R20.0 NUMBNESS AND TINGLING IN RIGHT HAND: ICD-10-CM

## 2021-07-26 DIAGNOSIS — F43.21 SITUATIONAL DEPRESSION: ICD-10-CM

## 2021-07-26 DIAGNOSIS — J45.40 MODERATE PERSISTENT ASTHMA WITHOUT COMPLICATION: Primary | ICD-10-CM

## 2021-07-26 DIAGNOSIS — J45.40 MODERATE PERSISTENT ASTHMA WITHOUT COMPLICATION: Chronic | ICD-10-CM

## 2021-07-26 DIAGNOSIS — T78.40XA ALLERGY, INITIAL ENCOUNTER: ICD-10-CM

## 2021-07-26 PROCEDURE — 99214 OFFICE O/P EST MOD 30 MIN: CPT | Performed by: FAMILY MEDICINE

## 2021-07-26 RX ORDER — ALBUTEROL SULFATE 90 UG/1
2 AEROSOL, METERED RESPIRATORY (INHALATION) EVERY 6 HOURS PRN
Qty: 18 G | Refills: 5 | Status: SHIPPED | OUTPATIENT
Start: 2021-07-26

## 2021-07-26 NOTE — PROGRESS NOTES
Assessment/Plan:    1  Moderate persistent asthma without complication  Assessment & Plan:  Stable  Using inhaler an needed    Orders:  -     albuterol (PROVENTIL HFA,VENTOLIN HFA) 90 mcg/act inhaler; Inhale 2 puffs every 6 (six) hours as needed for wheezing    2  Situational depression  Assessment & Plan:  Taking zoloft-stable      Orders:  -     sertraline (ZOLOFT) 50 mg tablet; Take 1 tablet (50 mg total) by mouth daily    3  Allergy, initial encounter  Assessment & Plan:  Suggested using allegra      4  Numbness and tingling in right hand  Assessment & Plan:  Known carpel tunnel  Now worse      5  Moderate persistent asthma without complication  Comments:  Stable at this time  PRN Albuterol HFA refilled  Pt given samples of Symbicort 80/4 5mcg 2 puffs BID today  Pt urged to f/u with someone, in next 2 - 3 months  Assessment & Plan:  Stable  Using inhaler an needed    Orders:  -     albuterol (PROVENTIL HFA,VENTOLIN HFA) 90 mcg/act inhaler; Inhale 2 puffs every 6 (six) hours as needed for wheezing    BMI Counseling: Body mass index is 28 47 kg/m²  The BMI is above normal  Nutrition recommendations include encouraging healthy choices of fruits and vegetables  Exercise recommendations include exercising 3-5 times per week  No pharmacotherapy was ordered  There are no Patient Instructions on file for this visit  No follow-ups on file  Subjective:      Patient ID: Kellie Gonsalez is a 61 y o  female      Chief Complaint   Patient presents with    Follow-up     medication refill, allergies and feet issues       Here for follow up  Asthma's has been ok this year  Allergies have been bad  Living with son  Still doesn't have insurance  Working 3 jobs  Right hand numb      The following portions of the patient's history were reviewed and updated as appropriate: allergies, current medications, past family history, past medical history, past social history, past surgical history and problem list     Review of Systems   Constitutional: Negative  HENT: Positive for postnasal drip and sneezing  Eyes: Negative  Respiratory: Negative  Cardiovascular: Negative  Endocrine: Negative  Genitourinary: Negative  Musculoskeletal: Positive for arthralgias (righ thand numbness, b/l foot pain)  Allergic/Immunologic: Negative  Neurological: Negative  Hematological: Negative  Psychiatric/Behavioral: Negative  Current Outpatient Medications   Medication Sig Dispense Refill    albuterol (PROVENTIL HFA,VENTOLIN HFA) 90 mcg/act inhaler Inhale 2 puffs every 6 (six) hours as needed for wheezing 18 g 5    sertraline (ZOLOFT) 50 mg tablet Take 1 tablet (50 mg total) by mouth daily 30 tablet 5     No current facility-administered medications for this visit  Objective:    /60   Pulse 85   Resp 16   Ht 5' (1 524 m)   Wt 66 1 kg (145 lb 12 8 oz)   SpO2 96%   BMI 28 47 kg/m²        Physical Exam  Vitals and nursing note reviewed  Constitutional:       Appearance: Normal appearance  HENT:      Head: Normocephalic and atraumatic  Eyes:      Extraocular Movements: Extraocular movements intact  Pupils: Pupils are equal, round, and reactive to light  Cardiovascular:      Rate and Rhythm: Normal rate and regular rhythm  Pulses: Normal pulses  Heart sounds: Normal heart sounds  Pulmonary:      Effort: Pulmonary effort is normal       Breath sounds: Normal breath sounds  Abdominal:      General: Abdomen is flat  Palpations: Abdomen is soft  Musculoskeletal:      Cervical back: Normal range of motion and neck supple  Skin:     General: Skin is warm  Capillary Refill: Capillary refill takes less than 2 seconds  Neurological:      General: No focal deficit present  Mental Status: She is alert and oriented to person, place, and time     Psychiatric:         Mood and Affect: Mood normal          Behavior: Behavior normal          Thought Content:  Thought content normal          Judgment: Judgment normal                 Margarita Amezquita, DO

## 2022-05-12 ENCOUNTER — TELEPHONE (OUTPATIENT)
Dept: FAMILY MEDICINE CLINIC | Facility: CLINIC | Age: 57
End: 2022-05-12
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
## 2022-09-15 ENCOUNTER — OFFICE VISIT (OUTPATIENT)
Dept: FAMILY MEDICINE CLINIC | Facility: CLINIC | Age: 57
End: 2022-09-15

## 2022-09-15 VITALS
OXYGEN SATURATION: 98 % | WEIGHT: 160.8 LBS | BODY MASS INDEX: 31.57 KG/M2 | HEIGHT: 60 IN | RESPIRATION RATE: 14 BRPM | SYSTOLIC BLOOD PRESSURE: 144 MMHG | DIASTOLIC BLOOD PRESSURE: 70 MMHG | HEART RATE: 83 BPM | TEMPERATURE: 97.1 F

## 2022-09-15 DIAGNOSIS — J45.40 MODERATE PERSISTENT ASTHMA WITHOUT COMPLICATION: Chronic | ICD-10-CM

## 2022-09-15 DIAGNOSIS — E78.2 MIXED HYPERLIPIDEMIA: ICD-10-CM

## 2022-09-15 DIAGNOSIS — Z12.31 ENCOUNTER FOR SCREENING MAMMOGRAM FOR MALIGNANT NEOPLASM OF BREAST: ICD-10-CM

## 2022-09-15 DIAGNOSIS — J45.40 MODERATE PERSISTENT ASTHMA WITHOUT COMPLICATION: Primary | ICD-10-CM

## 2022-09-15 DIAGNOSIS — F43.21 SITUATIONAL DEPRESSION: ICD-10-CM

## 2022-09-15 PROCEDURE — 99213 OFFICE O/P EST LOW 20 MIN: CPT | Performed by: FAMILY MEDICINE

## 2022-09-15 RX ORDER — SERTRALINE HYDROCHLORIDE 100 MG/1
100 TABLET, FILM COATED ORAL DAILY
Qty: 30 TABLET | Refills: 5 | Status: SHIPPED | OUTPATIENT
Start: 2022-09-15

## 2022-09-15 RX ORDER — ALBUTEROL SULFATE 90 UG/1
2 AEROSOL, METERED RESPIRATORY (INHALATION) EVERY 6 HOURS PRN
Qty: 18 G | Refills: 5 | Status: SHIPPED | OUTPATIENT
Start: 2022-09-15

## 2022-09-15 RX ORDER — ASPIRIN 81 MG/1
81 TABLET ORAL DAILY
COMMUNITY

## 2022-09-15 NOTE — PROGRESS NOTES
Assessment/Plan:    1  Moderate persistent asthma without complication  Assessment & Plan:  Uses rescue when needed    Orders:  -     albuterol (PROVENTIL HFA,VENTOLIN HFA) 90 mcg/act inhaler; Inhale 2 puffs every 6 (six) hours as needed for wheezing    2  Mixed hyperlipidemia  Assessment & Plan:  No insurance right now    Orders:  -     Comprehensive metabolic panel; Future; Expected date: 09/15/2022  -     Lipid Panel with Direct LDL reflex; Future; Expected date: 09/15/2022    3  Situational depression  Assessment & Plan:  Nathaniel Echavarria  Was off due to moving her appt    Orders:  -     sertraline (ZOLOFT) 100 mg tablet; Take 1 tablet (100 mg total) by mouth daily    4  Encounter for screening mammogram for malignant neoplasm of breast  -     Mammo screening bilateral w 3d & cad; Future; Expected date: 09/15/2022    5  Moderate persistent asthma without complication  Comments:  Stable at this time  PRN Albuterol HFA refilled  Pt given samples of Symbicort 80/4 5mcg 2 puffs BID today  Pt urged to f/u with someone, in next 2 - 3 months  Assessment & Plan:  Uses rescue when needed    Orders:  -     albuterol (PROVENTIL HFA,VENTOLIN HFA) 90 mcg/act inhaler; Inhale 2 puffs every 6 (six) hours as needed for wheezing      BMI Counseling: Body mass index is 31 4 kg/m²  The BMI is above normal  Nutrition recommendations include encouraging healthy choices of fruits and vegetables  Exercise recommendations include exercising 3-5 times per week  No pharmacotherapy was ordered  Rationale for BMI follow-up plan is due to patient being overweight or obese  There are no Patient Instructions on file for this visit  Return in about 6 months (around 3/15/2023) for Recheck  Subjective:      Patient ID: Velma Rebollar is a 61 y o  female      Chief Complaint   Patient presents with    Follow-up     Patient here for follow up       Here for follow up  Son and girlfriend were in motorcycle accident -she has been caregiver for them  Has been off meds  Cancelled appt  Was off zoloft  Weight increased  Asthma has worsened    Asthma  She complains of chest tightness and wheezing  This is a chronic problem  The problem occurs constantly  The problem has been waxing and waning  Her symptoms are alleviated by beta-agonist  She reports complete improvement on treatment  Her past medical history is significant for asthma  The following portions of the patient's history were reviewed and updated as appropriate: allergies, current medications, past family history, past medical history, past social history, past surgical history and problem list     Review of Systems   Constitutional: Negative  HENT: Negative  Eyes: Negative  Respiratory: Positive for wheezing  Cardiovascular: Negative  Gastrointestinal: Negative  Endocrine: Negative  Genitourinary: Negative  Musculoskeletal: Negative  Skin: Negative  Allergic/Immunologic: Negative  Neurological: Negative  Hematological: Negative  Psychiatric/Behavioral: Positive for dysphoric mood  Current Outpatient Medications   Medication Sig Dispense Refill    albuterol (PROVENTIL HFA,VENTOLIN HFA) 90 mcg/act inhaler Inhale 2 puffs every 6 (six) hours as needed for wheezing 18 g 5    aspirin (ECOTRIN LOW STRENGTH) 81 mg EC tablet Take 81 mg by mouth daily      Calcium Carbonate-Vit D-Min (CALCIUM 1200 PO) Take by mouth      Magnesium Gluconate (MAGNESIUM 27 PO) Take by mouth      sertraline (ZOLOFT) 100 mg tablet Take 1 tablet (100 mg total) by mouth daily 30 tablet 5     No current facility-administered medications for this visit  Objective:    /70 (BP Location: Left arm, Patient Position: Sitting, Cuff Size: Standard)   Pulse 83   Temp (!) 97 1 °F (36 2 °C) (Tympanic)   Resp 14   Ht 5' (1 524 m)   Wt 72 9 kg (160 lb 12 8 oz)   SpO2 98%   BMI 31 40 kg/m²        Physical Exam  Vitals and nursing note reviewed  Constitutional:       Appearance: Normal appearance  Eyes:      Extraocular Movements: Extraocular movements intact  Pupils: Pupils are equal, round, and reactive to light  Cardiovascular:      Rate and Rhythm: Normal rate and regular rhythm  Pulses: Normal pulses  Heart sounds: Normal heart sounds  Pulmonary:      Effort: Pulmonary effort is normal       Breath sounds: Normal breath sounds  Abdominal:      General: Abdomen is flat  Palpations: Abdomen is soft  Musculoskeletal:      Cervical back: Normal range of motion and neck supple  Neurological:      Mental Status: She is alert                  Kate Randall, DO

## 2022-10-29 ENCOUNTER — OFFICE VISIT (OUTPATIENT)
Dept: URGENT CARE | Age: 57
End: 2022-10-29
Payer: COMMERCIAL

## 2022-10-29 VITALS
BODY MASS INDEX: 30.47 KG/M2 | WEIGHT: 156 LBS | TEMPERATURE: 98.9 F | SYSTOLIC BLOOD PRESSURE: 140 MMHG | HEART RATE: 73 BPM | DIASTOLIC BLOOD PRESSURE: 70 MMHG | OXYGEN SATURATION: 98 % | RESPIRATION RATE: 20 BRPM

## 2022-10-29 DIAGNOSIS — J06.9 VIRAL URI WITH COUGH: ICD-10-CM

## 2022-10-29 DIAGNOSIS — R05.1 ACUTE COUGH: ICD-10-CM

## 2022-10-29 DIAGNOSIS — J45.41 MODERATE PERSISTENT ASTHMA WITH ACUTE EXACERBATION: Primary | ICD-10-CM

## 2022-10-29 LAB
SARS-COV-2 AG UPPER RESP QL IA: NEGATIVE
VALID CONTROL: NORMAL

## 2022-10-29 PROCEDURE — 87811 SARS-COV-2 COVID19 W/OPTIC: CPT | Performed by: PHYSICIAN ASSISTANT

## 2022-10-29 PROCEDURE — G0382 LEV 3 HOSP TYPE B ED VISIT: HCPCS | Performed by: PHYSICIAN ASSISTANT

## 2022-10-29 RX ORDER — BROMPHENIRAMINE MALEATE, PSEUDOEPHEDRINE HYDROCHLORIDE, AND DEXTROMETHORPHAN HYDROBROMIDE 2; 30; 10 MG/5ML; MG/5ML; MG/5ML
10 SYRUP ORAL 3 TIMES DAILY PRN
Qty: 120 ML | Refills: 0 | Status: SHIPPED | OUTPATIENT
Start: 2022-10-29

## 2022-10-29 RX ORDER — PREDNISONE 20 MG/1
40 TABLET ORAL DAILY
Qty: 8 TABLET | Refills: 0 | Status: SHIPPED | OUTPATIENT
Start: 2022-10-29 | End: 2022-11-02

## 2022-10-29 RX ORDER — ALBUTEROL SULFATE 90 UG/1
2 AEROSOL, METERED RESPIRATORY (INHALATION) EVERY 6 HOURS PRN
Qty: 8.5 G | Refills: 0 | Status: SHIPPED | OUTPATIENT
Start: 2022-10-29

## 2022-10-29 NOTE — PATIENT INSTRUCTIONS
Take prednisone as prescribed  Take oral med as needed for cough  If symptoms do not improve in next 5-7 days follow-up with PCP or return back for re-evaluation    If symptoms worsen or you develop chest pain report to the emergency room

## 2022-10-29 NOTE — LETTER
October 29, 2022     Patient: Rene Mccain   YOB: 1965   Date of Visit: 10/29/2022       To Whom It May Concern: It is my medical opinion that Chestine Lords should remain out of work until 10/31/2022       If you have any questions or concerns, please don't hesitate to call           Sincerely,        James Stanley PA-C    CC: No Recipients

## 2022-10-29 NOTE — PROGRESS NOTES
[facility]        NAME: Gely Graves is a 61 y o  female  : 1965    MRN: [id number]  DATE: 2022  TIME: 1:27 PM    Assessment and Plan   Moderate persistent asthma with acute exacerbation [J45 41]  1  Moderate persistent asthma with acute exacerbation  predniSONE 20 mg tablet    brompheniramine-pseudoephedrine-DM 30-2-10 MG/5ML syrup    albuterol (ProAir HFA) 90 mcg/act inhaler   2  Acute cough  Poct Covid 19 Rapid Antigen Test    brompheniramine-pseudoephedrine-DM 30-2-10 MG/5ML syrup    albuterol (ProAir HFA) 90 mcg/act inhaler   3  Viral URI with cough  albuterol (ProAir HFA) 90 mcg/act inhaler   Pt presents with symptoms consistent with possible COVID 19 infection, recommend rapid testing  Rapid COVID in clinic is negative  Symptoms are consistent with asthma exacerbation secondary to viral URI She is provided with a prescription for prednisone, Bromphed, and refill of her inhaler  She is instructed to follow-up with her PCP on Monday if she is not improved  She will report to the emergency department if her symptoms worsen  Patient Instructions     Patient Instructions   Take prednisone as prescribed  Take oral med as needed for cough  If symptoms do not improve in next 5-7 days follow-up with PCP or return back for re-evaluation  If symptoms worsen or you develop chest pain report to the emergency room      Follow up with PCP in 3-5 days  Proceed to  ER if symptoms worsen  Chief Complaint     Chief Complaint   Patient presents with   • Cough     Cough, nasal congestion, headache, shortness of breath x 2 days         History of Present Illness       61year old female presents with complaints of runny nose, sinus congestion, cough, and shortness of breath for 2 days duration  She also notes headache, mild sore throat, and states she has been having chills today  Pt denies fever,  chest pain, nausea, vomiting, diarrhea, fatigue, myalgias, and loss of taste and smell   She denies any recent sick contacts  Pt does have a history of asthma and has been needing to use her inhaler much more frequently over the last few day s She notes she often gets and asthma exacerbation with respiratory illnesses  She is vaccinated against COVID 19, but has not had any boosters  No other concerns or complaints today  Review of Systems   Review of Systems   Constitutional: Negative for chills, fatigue and fever  HENT: Positive for congestion, postnasal drip, rhinorrhea and sore throat  Negative for trouble swallowing and voice change  Respiratory: Positive for cough, chest tightness and shortness of breath  Cardiovascular: Negative for chest pain  Gastrointestinal: Negative for diarrhea, nausea and vomiting  Musculoskeletal: Negative for myalgias  Neurological: Positive for headaches           Current Medications       Current Outpatient Medications:   •  albuterol (ProAir HFA) 90 mcg/act inhaler, Inhale 2 puffs every 6 (six) hours as needed for wheezing, Disp: 8 5 g, Rfl: 0  •  albuterol (PROVENTIL HFA,VENTOLIN HFA) 90 mcg/act inhaler, Inhale 2 puffs every 6 (six) hours as needed for wheezing, Disp: 18 g, Rfl: 5  •  aspirin (ECOTRIN LOW STRENGTH) 81 mg EC tablet, Take 81 mg by mouth daily, Disp: , Rfl:   •  brompheniramine-pseudoephedrine-DM 30-2-10 MG/5ML syrup, Take 10 mL by mouth 3 (three) times a day as needed for congestion or cough, Disp: 120 mL, Rfl: 0  •  Calcium Carbonate-Vit D-Min (CALCIUM 1200 PO), Take by mouth, Disp: , Rfl:   •  Magnesium Gluconate (MAGNESIUM 27 PO), Take by mouth, Disp: , Rfl:   •  predniSONE 20 mg tablet, Take 2 tablets (40 mg total) by mouth daily for 4 days, Disp: 8 tablet, Rfl: 0  •  sertraline (ZOLOFT) 100 mg tablet, Take 1 tablet (100 mg total) by mouth daily, Disp: 30 tablet, Rfl: 5    Current Allergies     Allergies as of 10/29/2022 - Reviewed 10/29/2022   Allergen Reaction Noted   • Pollen extract  12/04/2014            The following portions of the patient's history were reviewed and updated as appropriate: allergies, current medications, past family history, past medical history, past social history, past surgical history and problem list      Past Medical History:   Diagnosis Date   • Asthma    • Dysfunction of eustachian tube    • Exposure to STD     Last assessed: 2013   • Lump in throat     Last assessed: 2014   • Neck muscle spasm     Last assessed: 3/29/2013   • Pregnancy    • TV (tinea versicolor)     Last Assessed: 2013       Past Surgical History:   Procedure Laterality Date   •  SECTION      son    • COLPOSCOPY VULVA W/ BIOPSY  2017    COLPO- AUBREY 1/ HPV   • LASIK Bilateral    • NEUROPLASTY / TRANSPOSITION MEDIAN NERVE AT CARPAL TUNNEL Left        Family History   Problem Relation Age of Onset   • Heart attack Mother    • Heart attack Father    • Diabetes Sister    • Hyperlipidemia Sister    • Coronary artery disease Brother          Medications have been verified  Objective   /70   Pulse 73   Temp 98 9 °F (37 2 °C)   Resp 20   Wt 70 8 kg (156 lb)   SpO2 98%   BMI 30 47 kg/m²   No LMP recorded  Patient is postmenopausal        Physical Exam     Physical Exam  Vitals and nursing note reviewed  Constitutional:       General: She is not in acute distress  Appearance: Normal appearance  She is not ill-appearing or toxic-appearing  HENT:      Head: Normocephalic and atraumatic  Jaw: No trismus  Right Ear: Tympanic membrane, ear canal and external ear normal  There is no impacted cerumen  No foreign body  Left Ear: Tympanic membrane, ear canal and external ear normal  There is no impacted cerumen  No foreign body  Nose: Mucosal edema, congestion and rhinorrhea present  No nasal deformity  Rhinorrhea is clear  Right Nostril: No foreign body, epistaxis or occlusion  Left Nostril: No foreign body, epistaxis or occlusion        Right Turbinates: Not enlarged, swollen or pale  Left Turbinates: Not enlarged, swollen or pale  Mouth/Throat:      Lips: Pink  No lesions  Mouth: Mucous membranes are moist  No injury, oral lesions or angioedema  Dentition: Normal dentition  Tongue: No lesions  Tongue does not deviate from midline  Palate: No mass and lesions  Pharynx: Uvula midline  No pharyngeal swelling, oropharyngeal exudate, posterior oropharyngeal erythema or uvula swelling  Tonsils: No tonsillar exudate or tonsillar abscesses  Eyes:      General: Lids are normal  Gaze aligned appropriately  No allergic shiner  Extraocular Movements: Extraocular movements intact  Cardiovascular:      Rate and Rhythm: Normal rate and regular rhythm  Heart sounds: Normal heart sounds, S1 normal and S2 normal  Heart sounds not distant  No murmur heard  No friction rub  No gallop  Pulmonary:      Effort: Pulmonary effort is normal       Breath sounds: Examination of the right-upper field reveals wheezing  Examination of the left-upper field reveals wheezing  Examination of the right-middle field reveals wheezing  Examination of the left-middle field reveals wheezing  Examination of the right-lower field reveals wheezing  Examination of the left-lower field reveals wheezing  Wheezing present  No decreased breath sounds, rhonchi or rales  Comments: Patient speaking in full sentences with no increased respiratory effort  No audible wheezing or stridor  Lymphadenopathy:      Cervical: Cervical adenopathy present  Right cervical: No superficial, deep or posterior cervical adenopathy  Left cervical: Superficial cervical adenopathy present  No deep or posterior cervical adenopathy  Skin:     General: Skin is warm and dry  Neurological:      Mental Status: She is alert and oriented to person, place, and time  Coordination: Coordination is intact  Gait: Gait is intact     Psychiatric:         Attention and Perception: Attention and perception normal          Mood and Affect: Mood and affect normal          Speech: Speech normal          Behavior: Behavior is cooperative  Note: Portions of this record may have been created with voice recognition software  Occasional wrong word or "sound a like" substitutions may have occurred due to the inherent limitations of voice recognition software  Please read the chart carefully and recognize, using context, where substitutions have occurred  *

## 2023-05-01 ENCOUNTER — OFFICE VISIT (OUTPATIENT)
Dept: FAMILY MEDICINE CLINIC | Facility: CLINIC | Age: 58
End: 2023-05-01

## 2023-05-01 VITALS
RESPIRATION RATE: 16 BRPM | HEIGHT: 60 IN | BODY MASS INDEX: 31.8 KG/M2 | WEIGHT: 162 LBS | HEART RATE: 81 BPM | SYSTOLIC BLOOD PRESSURE: 130 MMHG | TEMPERATURE: 96 F | DIASTOLIC BLOOD PRESSURE: 80 MMHG | OXYGEN SATURATION: 96 %

## 2023-05-01 DIAGNOSIS — E78.2 MIXED HYPERLIPIDEMIA: Primary | ICD-10-CM

## 2023-05-01 DIAGNOSIS — Z12.31 ENCOUNTER FOR SCREENING MAMMOGRAM FOR MALIGNANT NEOPLASM OF BREAST: ICD-10-CM

## 2023-05-01 DIAGNOSIS — R73.01 IFG (IMPAIRED FASTING GLUCOSE): ICD-10-CM

## 2023-05-01 DIAGNOSIS — F33.42 RECURRENT MAJOR DEPRESSIVE DISORDER, IN FULL REMISSION (HCC): ICD-10-CM

## 2023-05-01 NOTE — PROGRESS NOTES
Assessment/Plan:    1  Mixed hyperlipidemia  Assessment & Plan:  Check lipids    Orders:  -     Comprehensive metabolic panel; Future; Expected date: 05/01/2023  -     Lipid Panel with Direct LDL reflex; Future; Expected date: 05/01/2023    2  IFG (impaired fasting glucose)  Assessment & Plan:  Check aic    Orders:  -     Hemoglobin A1C; Future; Expected date: 05/01/2023    3  Recurrent major depressive disorder, in full remission ([facility] [address] )  Assessment & Plan:  Stable on zoloft      4  Encounter for screening mammogram for malignant neoplasm of breast  -     Mammo screening bilateral w 3d & cad; Future; Expected date: 05/01/2023      BMI Counseling: Body mass index is 31 64 kg/m²  The BMI is above normal  Nutrition recommendations include encouraging healthy choices of fruits and vegetables  Rationale for BMI follow-up plan is due to patient being overweight or obese  There are no Patient Instructions on file for this visit  Return in about 6 months (around 11/1/2023) for Annual physical     Subjective:      Patient ID: Merly Douglas is a 60 y o  female  Chief Complaint   Patient presents with    Follow-up     Patient here for 6 month follow up       Here for follow up  Using rescue couple times a day  Working 3 jobs  Not active  Dog has diabetes and can't walk  1 year from son's accident      The following portions of the patient's history were reviewed and updated as appropriate: allergies, current medications, past family history, past medical history, past social history, past surgical history and problem list     Review of Systems   Constitutional: Negative  HENT: Negative  Eyes: Negative  Respiratory: Positive for shortness of breath  Cardiovascular: Negative  Gastrointestinal: Negative  Endocrine: Negative  Genitourinary: Negative  Musculoskeletal: Negative  Allergic/Immunologic: Negative  Hematological: Negative  Psychiatric/Behavioral: Negative  Current Outpatient Medications   Medication Sig Dispense Refill    albuterol (ProAir HFA) 90 mcg/act inhaler Inhale 2 puffs every 6 (six) hours as needed for wheezing 8 5 g 0    sertraline (ZOLOFT) 100 mg tablet Take 1 tablet (100 mg total) by mouth daily 30 tablet 5    aspirin (ECOTRIN LOW STRENGTH) 81 mg EC tablet Take 81 mg by mouth daily (Patient not taking: Reported on 5/1/2023)      Calcium Carbonate-Vit D-Min (CALCIUM 1200 PO) Take by mouth (Patient not taking: Reported on 5/1/2023)       No current facility-administered medications for this visit  Objective:    /80 (BP Location: Left arm, Patient Position: Sitting, Cuff Size: Standard)   Pulse 81   Temp (!) 96 °F (35 6 °C) (Tympanic)   Resp 16   Ht 5' (1 524 m)   Wt 73 5 kg (162 lb)   SpO2 96%   BMI 31 64 kg/m²        Physical Exam  Vitals and nursing note reviewed  Constitutional:       Appearance: She is well-developed  HENT:      Head: Normocephalic and atraumatic  Nose: Nose normal    Eyes:      General: Lids are normal       Conjunctiva/sclera: Conjunctivae normal       Pupils: Pupils are equal, round, and reactive to light  Cardiovascular:      Rate and Rhythm: Normal rate and regular rhythm  Heart sounds: Normal heart sounds, S1 normal and S2 normal    Pulmonary:      Effort: Pulmonary effort is normal       Breath sounds: Normal breath sounds  Abdominal:      General: Bowel sounds are normal       Palpations: Abdomen is soft  Musculoskeletal:         General: Normal range of motion  Cervical back: Normal range of motion and neck supple  Skin:     General: Skin is warm and dry  Neurological:      Mental Status: She is alert and oriented to person, place, and time  Deep Tendon Reflexes: Reflexes are normal and symmetric  Psychiatric:         Speech: Speech normal          Behavior: Behavior normal          Thought Content:  Thought content normal          Judgment: Judgment normal  Edita Thapa, DO

## 2023-06-14 DIAGNOSIS — J45.41 MODERATE PERSISTENT ASTHMA WITH ACUTE EXACERBATION: ICD-10-CM

## 2023-06-14 DIAGNOSIS — R05.1 ACUTE COUGH: ICD-10-CM

## 2023-06-14 DIAGNOSIS — J06.9 VIRAL URI WITH COUGH: ICD-10-CM

## 2023-06-14 RX ORDER — ALBUTEROL SULFATE 90 UG/1
AEROSOL, METERED RESPIRATORY (INHALATION)
Qty: 8.5 G | Refills: 0 | Status: SHIPPED | OUTPATIENT
Start: 2023-06-14

## 2023-10-06 DIAGNOSIS — F43.21 SITUATIONAL DEPRESSION: ICD-10-CM

## 2023-10-06 RX ORDER — SERTRALINE HYDROCHLORIDE 100 MG/1
100 TABLET, FILM COATED ORAL DAILY
Qty: 30 TABLET | Refills: 5 | Status: SHIPPED | OUTPATIENT
Start: 2023-10-06

## 2023-11-25 DIAGNOSIS — R05.1 ACUTE COUGH: ICD-10-CM

## 2023-11-25 DIAGNOSIS — J06.9 VIRAL URI WITH COUGH: ICD-10-CM

## 2023-11-25 DIAGNOSIS — J45.41 MODERATE PERSISTENT ASTHMA WITH ACUTE EXACERBATION: ICD-10-CM

## 2023-11-27 RX ORDER — ALBUTEROL SULFATE 90 UG/1
AEROSOL, METERED RESPIRATORY (INHALATION)
Qty: 8.5 G | Refills: 0 | Status: SHIPPED | OUTPATIENT
Start: 2023-11-27

## 2024-02-21 PROBLEM — Z12.39 SCREENING FOR BREAST CANCER: Status: RESOLVED | Noted: 2019-08-29 | Resolved: 2024-02-21

## 2024-03-17 ENCOUNTER — OFFICE VISIT (OUTPATIENT)
Dept: URGENT CARE | Age: 59
End: 2024-03-17
Payer: COMMERCIAL

## 2024-03-17 VITALS
HEIGHT: 60 IN | WEIGHT: 150 LBS | HEART RATE: 88 BPM | TEMPERATURE: 98.6 F | RESPIRATION RATE: 18 BRPM | OXYGEN SATURATION: 96 % | BODY MASS INDEX: 29.45 KG/M2 | SYSTOLIC BLOOD PRESSURE: 149 MMHG | DIASTOLIC BLOOD PRESSURE: 63 MMHG

## 2024-03-17 DIAGNOSIS — J45.41 MODERATE PERSISTENT ASTHMA WITH ACUTE EXACERBATION: ICD-10-CM

## 2024-03-17 DIAGNOSIS — J06.9 BACTERIAL URI: Primary | ICD-10-CM

## 2024-03-17 DIAGNOSIS — B96.89 BACTERIAL URI: Primary | ICD-10-CM

## 2024-03-17 LAB
SARS-COV-2 AG UPPER RESP QL IA: NEGATIVE
VALID CONTROL: NORMAL

## 2024-03-17 PROCEDURE — 94640 AIRWAY INHALATION TREATMENT: CPT

## 2024-03-17 PROCEDURE — G0384 LEV 5 HOSP TYPE B ED VISIT: HCPCS

## 2024-03-17 PROCEDURE — 87811 SARS-COV-2 COVID19 W/OPTIC: CPT

## 2024-03-17 RX ORDER — AMOXICILLIN AND CLAVULANATE POTASSIUM 875; 125 MG/1; MG/1
1 TABLET, FILM COATED ORAL EVERY 12 HOURS SCHEDULED
Qty: 14 TABLET | Refills: 0 | Status: SHIPPED | OUTPATIENT
Start: 2024-03-17 | End: 2024-03-17 | Stop reason: ALTCHOICE

## 2024-03-17 RX ORDER — ALBUTEROL SULFATE 90 UG/1
2 AEROSOL, METERED RESPIRATORY (INHALATION) EVERY 4 HOURS PRN
Qty: 6.7 G | Refills: 0 | Status: SHIPPED | OUTPATIENT
Start: 2024-03-17

## 2024-03-17 RX ORDER — PREDNISONE 20 MG/1
40 TABLET ORAL DAILY
Qty: 10 TABLET | Refills: 0 | Status: SHIPPED | OUTPATIENT
Start: 2024-03-17 | End: 2024-03-22

## 2024-03-17 RX ORDER — DOXYCYCLINE 100 MG/1
100 TABLET ORAL 2 TIMES DAILY
Qty: 14 TABLET | Refills: 0 | Status: SHIPPED | OUTPATIENT
Start: 2024-03-17 | End: 2024-03-24

## 2024-03-17 RX ORDER — ALBUTEROL SULFATE 2.5 MG/3ML
2.5 SOLUTION RESPIRATORY (INHALATION) EVERY 6 HOURS PRN
Qty: 120 ML | Refills: 0 | Status: SHIPPED | OUTPATIENT
Start: 2024-03-17

## 2024-03-17 RX ORDER — ALBUTEROL SULFATE 2.5 MG/3ML
5 SOLUTION RESPIRATORY (INHALATION) ONCE
Status: COMPLETED | OUTPATIENT
Start: 2024-03-17 | End: 2024-03-17

## 2024-03-17 RX ADMIN — ALBUTEROL SULFATE 5 MG: 2.5 SOLUTION RESPIRATORY (INHALATION) at 10:51

## 2024-03-17 RX ADMIN — Medication 0.5 MG: at 10:45

## 2024-03-17 NOTE — PROGRESS NOTES
[facility]        NAME: Susie Peña is a 59 y.o. female  : 1965    MRN: [id number]  DATE: 2024  TIME: 11:36 AM      Assessment and Plan     Bacterial URI [J06.9, B96.89]  1. Bacterial URI  Poct Covid 19 Rapid Antigen Test    XR chest pa & lateral    doxycycline (ADOXA) 100 MG tablet    predniSONE 20 mg tablet    DISCONTINUED: amoxicillin-clavulanate (AUGMENTIN) 875-125 mg per tablet      2. Moderate persistent asthma with acute exacerbation  ipratropium (ATROVENT) 0.02 % inhalation solution 0.5 mg    albuterol inhalation solution 5 mg    XR chest pa & lateral    albuterol (Proventil HFA) 90 mcg/act inhaler    albuterol (2.5 mg/3 mL) 0.083 % nebulizer solution    predniSONE 20 mg tablet        Rapid covid negative    Patient reports decreased SOB and wheezing post breathing treatment. Trace wheezing heard throughout post nebulizer treatment- patient moving air moving.   Discussed steroid use with patient- aware to f/u with PCP in one day (as soon as possible) and to stop if it causes anxiety    Patient Instructions     Take antibiotic as prescribed. Recommend probiotic use while taking antibiotic.  Avoid direct sunlight with use of doxycyline, reapply SPF 50 at least every 1-2 hours, wear long sleeves, and a wide brimmed hat.  Take steroids as directed. Recommend to take them in the morning and with food.   Use albuterol as directed, as needed for shortness of breath and wheezing.   Acetaminophen for pain.   Follow-up with PCP in 1-2 days. Go to ER if symptoms worsen.   Chief Complaint     Chief Complaint   Patient presents with    Cough     Cough, chest congestion, chest tightness, episodes of SOB since friday         History of Present Illness     Patient is a 59-year-old female who presents with cough, shortness of breath, and chest tightness for 3 days. Reports last use of inhaler this morning- no relief. Reports she was having right back pain last night- did use a heating pad.  Reports chills, no fever. Denies nausea or vomiting. Denies known sick contacts.     Cough  Associated symptoms include shortness of breath and wheezing. Pertinent negatives include no fever.       Review of Systems     Review of Systems   Constitutional:  Negative for fever.   Respiratory:  Positive for cough, shortness of breath and wheezing.    Gastrointestinal:  Negative for diarrhea, nausea and vomiting.   All other systems reviewed and are negative.        Current Medications       Current Outpatient Medications:     albuterol (2.5 mg/3 mL) 0.083 % nebulizer solution, Take 3 mL (2.5 mg total) by nebulization every 6 (six) hours as needed for wheezing or shortness of breath, Disp: 120 mL, Rfl: 0    albuterol (Proventil HFA) 90 mcg/act inhaler, Inhale 2 puffs every 4 (four) hours as needed for wheezing, Disp: 6.7 g, Rfl: 0    albuterol (PROVENTIL HFA,VENTOLIN HFA) 90 mcg/act inhaler, inhale 2 puffs by mouth and INTO THE LUNGS every 6 hours if needed for WHEEZING, Disp: 8.5 g, Rfl: 0    doxycycline (ADOXA) 100 MG tablet, Take 1 tablet (100 mg total) by mouth 2 (two) times a day for 7 days, Disp: 14 tablet, Rfl: 0    predniSONE 20 mg tablet, Take 2 tablets (40 mg total) by mouth daily for 5 days, Disp: 10 tablet, Rfl: 0    sertraline (ZOLOFT) 100 mg tablet, take 1 tablet by mouth once daily, Disp: 30 tablet, Rfl: 5    aspirin (ECOTRIN LOW STRENGTH) 81 mg EC tablet, Take 81 mg by mouth daily (Patient not taking: Reported on 5/1/2023), Disp: , Rfl:     Calcium Carbonate-Vit D-Min (CALCIUM 1200 PO), Take by mouth (Patient not taking: Reported on 5/1/2023), Disp: , Rfl:   No current facility-administered medications for this visit.    Current Allergies     Allergies as of 03/17/2024 - Reviewed 03/17/2024   Allergen Reaction Noted    Pollen extract  12/04/2014              The following portions of the patient's history were reviewed and updated as appropriate: allergies, current medications, past family history, past  medical history, past social history, past surgical history and problem list.     Past Medical History:   Diagnosis Date    Asthma     Dysfunction of eustachian tube     Exposure to STD     Last assessed: 2013    Lump in throat     Last assessed: 2014    Neck muscle spasm     Last assessed: 3/29/2013    Pregnancy     TV (tinea versicolor)     Last Assessed: 2013       Past Surgical History:   Procedure Laterality Date     SECTION      son     COLPOSCOPY VULVA W/ BIOPSY  2017    COLPO- AUBREY 1/ HPV    LASIK Bilateral     NEUROPLASTY / TRANSPOSITION MEDIAN NERVE AT CARPAL TUNNEL Left        Family History   Problem Relation Age of Onset    Heart attack Mother     Heart attack Father     Diabetes Sister     Hyperlipidemia Sister     Coronary artery disease Brother          Medications have been verified.        Objective     /63   Pulse 88   Temp 98.6 °F (37 °C)   Resp 18   Ht 5' (1.524 m)   Wt 68 kg (150 lb)   SpO2 96%   BMI 29.29 kg/m²   No LMP recorded. Patient is postmenopausal.         Physical Exam     Physical Exam  Vitals and nursing note reviewed.   Constitutional:       General: She is awake. She is not in acute distress.     Appearance: Normal appearance. She is not ill-appearing, toxic-appearing or diaphoretic.   HENT:      Nose: Nose normal.   Cardiovascular:      Rate and Rhythm: Normal rate.      Pulses: Normal pulses.      Heart sounds: Normal heart sounds, S1 normal and S2 normal.   Pulmonary:      Effort: Pulmonary effort is normal.      Breath sounds: Decreased air movement present. Examination of the left-lower field reveals rhonchi. Wheezing (throughout) and rhonchi present. No decreased breath sounds.   Skin:     General: Skin is warm.      Capillary Refill: Capillary refill takes less than 2 seconds.   Neurological:      Mental Status: She is alert.   Psychiatric:         Mood and Affect: Mood normal.         Behavior: Behavior normal.         Thought  Content: Thought content normal.         Judgment: Judgment normal.     Mini neb    Performed by: GABINO Leonardo  Authorized by: GABINO Leonardo  Universal Protocol:  Procedure performed by: (xin jimenez)  Risks and benefits: risks, benefits and alternatives were discussed  Consent given by: patient  Patient identity confirmed: verbally with patient    Number of treatments:  1  Treatment 1:   Pre-Procedure     Symptoms:  Wheezing, difficulty breathing, shortness of breath and cough    Lung Sounds:  Wheezing throughout, decreased air movement    HR:  88    RR:  18    Medication Administered:  Duoneb - Albuterol 2.5 mg/Atrovent 0.5 mg (only 2.5 of the 5 mg ordered was administered)  Post-Procedure     Symptoms:  Wheezing (trace)    Lung sounds:  Trace wheezing throughout, normal air movement, no difficulty breathing or shortness of breath

## 2024-03-17 NOTE — PATIENT INSTRUCTIONS
Take antibiotic as prescribed. Recommend probiotic use while taking antibiotic.  Avoid direct sunlight with use of doxycyline, reapply SPF 50 at least every 1-2 hours, wear long sleeves, and a wide brimmed hat.  Take steroids as directed. Recommend to take them in the morning and with food.   Use albuterol as directed, as needed for shortness of breath and wheezing.   Acetaminophen for pain.   Follow-up with PCP in 1-2 days. Go to ER if symptoms worsen.

## 2024-06-13 ENCOUNTER — OFFICE VISIT (OUTPATIENT)
Dept: FAMILY MEDICINE CLINIC | Facility: CLINIC | Age: 59
End: 2024-06-13
Payer: COMMERCIAL

## 2024-06-13 VITALS
OXYGEN SATURATION: 97 % | SYSTOLIC BLOOD PRESSURE: 126 MMHG | HEART RATE: 81 BPM | BODY MASS INDEX: 33.26 KG/M2 | WEIGHT: 165 LBS | HEIGHT: 59 IN | RESPIRATION RATE: 17 BRPM | DIASTOLIC BLOOD PRESSURE: 74 MMHG | TEMPERATURE: 97 F

## 2024-06-13 DIAGNOSIS — F51.01 PRIMARY INSOMNIA: ICD-10-CM

## 2024-06-13 DIAGNOSIS — Z00.00 ANNUAL PHYSICAL EXAM: Primary | ICD-10-CM

## 2024-06-13 DIAGNOSIS — Z12.31 ENCOUNTER FOR SCREENING MAMMOGRAM FOR MALIGNANT NEOPLASM OF BREAST: ICD-10-CM

## 2024-06-13 DIAGNOSIS — E78.2 MIXED HYPERLIPIDEMIA: ICD-10-CM

## 2024-06-13 DIAGNOSIS — R73.01 IFG (IMPAIRED FASTING GLUCOSE): ICD-10-CM

## 2024-06-13 DIAGNOSIS — K21.9 GASTROESOPHAGEAL REFLUX DISEASE WITHOUT ESOPHAGITIS: ICD-10-CM

## 2024-06-13 PROCEDURE — 99396 PREV VISIT EST AGE 40-64: CPT | Performed by: NURSE PRACTITIONER

## 2024-06-13 RX ORDER — FLUTICASONE PROPIONATE AND SALMETEROL 100; 50 UG/1; UG/1
POWDER RESPIRATORY (INHALATION)
COMMUNITY

## 2024-06-13 NOTE — PROGRESS NOTES
Adult Annual Physical  Name: Susie Peña      : 1965      MRN: [id number]  Encounter Provider: GABINO Bundy  Encounter Date: 2024   Encounter department: [facility], [place]    Assessment & Plan   1. Annual physical exam  2. Encounter for screening mammogram for malignant neoplasm of breast  -     Mammo screening bilateral w 3d & cad; Future  3. Mixed hyperlipidemia  -     Comprehensive metabolic panel; Future  -     CBC and differential; Future  -     TSH, 3rd generation with Free T4 reflex; Future  -     Lipid Panel with Direct LDL reflex; Future  4. IFG (impaired fasting glucose)  -     Comprehensive metabolic panel; Future  -     CBC and differential; Future  -     TSH, 3rd generation with Free T4 reflex; Future  -     Lipid Panel with Direct LDL reflex; Future  5. Gastroesophageal reflux disease without esophagitis  -     Comprehensive metabolic panel; Future  -     CBC and differential; Future  -     TSH, 3rd generation with Free T4 reflex; Future  -     Lipid Panel with Direct LDL reflex; Future  6. Primary insomnia  -     Comprehensive metabolic panel; Future  -     CBC and differential; Future  -     TSH, 3rd generation with Free T4 reflex; Future  -     Lipid Panel with Direct LDL reflex; Future  Immunizations and preventive care screenings were discussed with patient today. Appropriate education was printed on patient's after visit summary.    Counseling:  Alcohol/drug use: discussed moderation in alcohol intake, the recommendations for healthy alcohol use, and avoidance of illicit drug use.  Dental Health: discussed importance of regular tooth brushing, flossing, and dental visits.  Injury prevention: discussed safety/seat belts, safety helmets, smoke detectors, carbon dioxide detectors, and smoking near bedding or upholstery.  Sexual health: discussed sexually transmitted diseases, partner selection, use of condoms, avoidance of unintended pregnancy, and contraceptive  alternatives.  Exercise: the importance of regular exercise/physical activity was discussed. Recommend exercise 3-5 times per week for at least 30 minutes.      Self pay patient does not have health insurance since 2015      History of Present Illness     Adult Annual Physical:  Patient presents for annual physical.     Diet and Physical Activity:  - Diet/Nutrition: poor diet.  - Exercise: no formal exercise.    General Health:  - Sleep: sleeps poorly. has to use sleeping pill to sleep  - Hearing: normal hearing right ear and normal hearing left ear.  - Vision: no vision problems and wears glasses. reading glasses  - Dental: no dental visits for > 1 year and brushes teeth once daily.    /GYN Health:  - Follows with GYN: no.   - Menopause: postmenopausal.   - History of STDs: no    Advanced Care Planning:  - Has an advanced directive?: no    - Has a durable medical POA?: no    - ACP document given to patient?: no      Review of Systems   Constitutional:  Negative for fatigue and fever.   HENT:  Negative for congestion, postnasal drip and rhinorrhea.    Eyes:  Negative for photophobia and visual disturbance.   Respiratory:  Negative for cough, shortness of breath and wheezing.    Cardiovascular:  Negative for chest pain and palpitations.   Gastrointestinal:  Negative for constipation, diarrhea, nausea and vomiting.   Genitourinary:  Negative for dysuria and frequency.   Musculoskeletal:  Negative for arthralgias and myalgias.   Skin:  Negative for rash.   Neurological:  Negative for dizziness, light-headedness and headaches.   Hematological:  Negative for adenopathy.   Psychiatric/Behavioral:  Negative for dysphoric mood and sleep disturbance. The patient is not nervous/anxious.      Pertinent Medical History         Medical History Reviewed by provider this encounter:  Tobacco  Allergies  Meds  Problems  Med Hx  Surg Hx  Fam Hx       Past Medical History   Past Medical History:   Diagnosis Date    Asthma      Dysfunction of eustachian tube     Exposure to STD     Last assessed: 2013    Lump in throat     Last assessed: 2014    Neck muscle spasm     Last assessed: 3/29/2013    Pregnancy     TV (tinea versicolor)     Last Assessed: 2013     Past Surgical History:   Procedure Laterality Date     SECTION      son     COLPOSCOPY VULVA W/ BIOPSY  2017    COLPO- AUBREY 1/ HPV    LASIK Bilateral     NEUROPLASTY / TRANSPOSITION MEDIAN NERVE AT CARPAL TUNNEL Left      Family History   Problem Relation Age of Onset    Heart attack Mother     Heart attack Father     Diabetes Sister     Hyperlipidemia Sister     Coronary artery disease Brother      Current Outpatient Medications on File Prior to Visit   Medication Sig Dispense Refill    albuterol (Proventil HFA) 90 mcg/act inhaler Inhale 2 puffs every 4 (four) hours as needed for wheezing 6.7 g 0    albuterol (PROVENTIL HFA,VENTOLIN HFA) 90 mcg/act inhaler inhale 2 puffs by mouth and INTO THE LUNGS every 6 hours if needed for WHEEZING 8.5 g 0    sertraline (ZOLOFT) 100 mg tablet take 1 tablet by mouth once daily 30 tablet 5    albuterol (2.5 mg/3 mL) 0.083 % nebulizer solution Take 3 mL (2.5 mg total) by nebulization every 6 (six) hours as needed for wheezing or shortness of breath (Patient not taking: Reported on 2024) 120 mL 0    aspirin (ECOTRIN LOW STRENGTH) 81 mg EC tablet Take 81 mg by mouth daily (Patient not taking: Reported on 2024)      Calcium Carbonate-Vit D-Min (CALCIUM 1200 PO) Take by mouth (Patient not taking: Reported on 2023)      Fluticasone-Salmeterol (Advair) 100-50 mcg/dose inhaler INHALE 1 PUFF BY MOUTH INTO THE LUNGS TWICE A DAY RINSE MOUTH AFTER USE       No current facility-administered medications on file prior to visit.     Allergies   Allergen Reactions    Pollen Extract       Current Outpatient Medications on File Prior to Visit   Medication Sig Dispense Refill    albuterol (Proventil HFA) 90 mcg/act inhaler  "Inhale 2 puffs every 4 (four) hours as needed for wheezing 6.7 g 0    albuterol (PROVENTIL HFA,VENTOLIN HFA) 90 mcg/act inhaler inhale 2 puffs by mouth and INTO THE LUNGS every 6 hours if needed for WHEEZING 8.5 g 0    sertraline (ZOLOFT) 100 mg tablet take 1 tablet by mouth once daily 30 tablet 5    albuterol (2.5 mg/3 mL) 0.083 % nebulizer solution Take 3 mL (2.5 mg total) by nebulization every 6 (six) hours as needed for wheezing or shortness of breath (Patient not taking: Reported on 6/13/2024) 120 mL 0    aspirin (ECOTRIN LOW STRENGTH) 81 mg EC tablet Take 81 mg by mouth daily (Patient not taking: Reported on 6/13/2024)      Calcium Carbonate-Vit D-Min (CALCIUM 1200 PO) Take by mouth (Patient not taking: Reported on 5/1/2023)      Fluticasone-Salmeterol (Advair) 100-50 mcg/dose inhaler INHALE 1 PUFF BY MOUTH INTO THE LUNGS TWICE A DAY RINSE MOUTH AFTER USE       No current facility-administered medications on file prior to visit.      Social History     Tobacco Use    Smoking status: Never    Smokeless tobacco: Never   Vaping Use    Vaping status: Never Used   Substance and Sexual Activity    Alcohol use: Yes     Comment: Being A Social Drinker     Drug use: Never    Sexual activity: Not on file       Objective     /74 (BP Location: Left arm, Patient Position: Sitting, Cuff Size: Standard)   Pulse 81   Temp (!) 97 °F (36.1 °C) (Tympanic)   Resp 17   Ht 4' 11.21\" (1.504 m)   Wt 74.8 kg (165 lb)   SpO2 97%   BMI 33.09 kg/m²     Physical Exam  Vitals and nursing note reviewed.   Constitutional:       Appearance: Normal appearance.   HENT:      Head: Normocephalic and atraumatic.      Right Ear: Tympanic membrane, ear canal and external ear normal.      Left Ear: Tympanic membrane, ear canal and external ear normal.      Nose: Nose normal.      Mouth/Throat:      Mouth: Mucous membranes are moist.   Eyes:      Conjunctiva/sclera: Conjunctivae normal.   Cardiovascular:      Rate and Rhythm: Normal rate " and regular rhythm.      Heart sounds: Normal heart sounds.   Pulmonary:      Effort: Pulmonary effort is normal.      Breath sounds: Normal breath sounds.   Abdominal:      General: Bowel sounds are normal.      Palpations: Abdomen is soft.   Musculoskeletal:         General: Normal range of motion.      Cervical back: Normal range of motion and neck supple.   Skin:     General: Skin is warm and dry.      Capillary Refill: Capillary refill takes less than 2 seconds.   Neurological:      General: No focal deficit present.      Mental Status: She is alert and oriented to person, place, and time.   Psychiatric:         Mood and Affect: Mood normal.         Behavior: Behavior normal.         Thought Content: Thought content normal.         Judgment: Judgment normal.       Administrative Statements   I have spent a total time of 25 minutes on 06/13/24 In caring for this patient including Diagnostic results, Prognosis, Risks and benefits of tx options, Instructions for management, Patient and family education, Importance of tx compliance, Risk factor reductions, Impressions, Counseling / Coordination of care, Documenting in the medical record, Reviewing / ordering tests, medicine, procedures  , and Obtaining or reviewing history  .  Depression Screening Follow-up Plan: Patient's depression screening was positive with a PHQ-2 score of . Their PHQ-9 score was . Patient assessed for underlying major depression. They have no active suicidal ideations. Brief counseling provided and recommend additional follow-up/re-evaluation next office visit.

## 2024-07-02 DIAGNOSIS — R05.1 ACUTE COUGH: ICD-10-CM

## 2024-07-02 DIAGNOSIS — J06.9 VIRAL URI WITH COUGH: ICD-10-CM

## 2024-07-02 DIAGNOSIS — J45.41 MODERATE PERSISTENT ASTHMA WITH ACUTE EXACERBATION: ICD-10-CM

## 2024-07-02 RX ORDER — FLUTICASONE PROPIONATE AND SALMETEROL 100; 50 UG/1; UG/1
POWDER RESPIRATORY (INHALATION)
Qty: 60 BLISTER | Refills: 3 | Status: SHIPPED | OUTPATIENT
Start: 2024-07-02

## 2024-07-02 RX ORDER — ALBUTEROL SULFATE 90 UG/1
AEROSOL, METERED RESPIRATORY (INHALATION)
Qty: 8.5 G | Refills: 0 | Status: SHIPPED | OUTPATIENT
Start: 2024-07-02

## 2024-07-02 NOTE — TELEPHONE ENCOUNTER
Reason for call:   [x] Refill   [] Prior Auth  [] Other:     Office:   [x] PCP/Provider - GABINO Bundy  [] Specialty/Provider -     Medication:   albuterol (PROVENTIL HFA,VENTOLIN HFA) 90 mcg/act inhaler   Fluticasone-Salmeterol (Advair) 100-50 mcg/dose inhaler          Dose/Frequency:   inhale 2 puffs by mouth and INTO THE LUNGS every 6 hours if needed for WHEEZING   INHALE 1 PUFF BY MOUTH INTO THE LUNGS TWICE A DAY RINSE MOUTH AFTER USE     Quantity:   8.5 g  ?    Pharmacy: [facility] [address] [place] [address] [phone]     Does the patient have enough for 3 days?   [] Yes   [x] No - Send as HP to POD

## 2024-07-24 DIAGNOSIS — F43.21 SITUATIONAL DEPRESSION: ICD-10-CM

## 2024-07-24 RX ORDER — SERTRALINE HYDROCHLORIDE 100 MG/1
100 TABLET, FILM COATED ORAL DAILY
Qty: 30 TABLET | Refills: 5 | Status: SHIPPED | OUTPATIENT
Start: 2024-07-24

## 2024-07-24 NOTE — TELEPHONE ENCOUNTER
Patient needs medication sent to a different pharmacy    Reason for call:   [x] Refill   [] Prior Auth  [] Other:     Office:   [x] PCP/Provider - Claudia Cesar DO  [] Specialty/Provider -     Medication: sertraline (ZOLOFT) 100 mg tablet     Dose/Frequency: 100 mg, Oral, Daily     Quantity: 30    Pharmacy: [facility] [address] [place] [address] [phone]     Does the patient have enough for 3 days?   [x] Yes   [] No - Send as HP to POD

## 2024-09-11 DIAGNOSIS — J45.41 MODERATE PERSISTENT ASTHMA WITH ACUTE EXACERBATION: ICD-10-CM

## 2024-09-11 DIAGNOSIS — F43.21 SITUATIONAL DEPRESSION: ICD-10-CM

## 2024-09-11 DIAGNOSIS — R05.1 ACUTE COUGH: ICD-10-CM

## 2024-09-11 DIAGNOSIS — J06.9 VIRAL URI WITH COUGH: ICD-10-CM

## 2024-09-11 RX ORDER — SERTRALINE HYDROCHLORIDE 100 MG/1
100 TABLET, FILM COATED ORAL DAILY
Qty: 30 TABLET | Refills: 5 | Status: SHIPPED | OUTPATIENT
Start: 2024-09-11

## 2024-09-11 RX ORDER — FLUTICASONE PROPIONATE AND SALMETEROL 100; 50 UG/1; UG/1
POWDER RESPIRATORY (INHALATION)
Qty: 60 BLISTER | Refills: 3 | Status: SHIPPED | OUTPATIENT
Start: 2024-09-11

## 2024-09-11 RX ORDER — ALBUTEROL SULFATE 90 UG/1
AEROSOL, METERED RESPIRATORY (INHALATION)
Qty: 8.5 G | Refills: 0 | Status: SHIPPED | OUTPATIENT
Start: 2024-09-11

## 2024-09-11 NOTE — TELEPHONE ENCOUNTER
Medication: sertraline (ZOLOFT) 100 mg tablet     Dose/Frequency: Take 1 tablet (100 mg total) by mouth daily     Quantity: 30 tab    Pharmacy: [facility] [address] [place] [address]      Office:   [x] PCP/Provider -   [] Speciality/Provider -     Does the patient have enough for 3 days?   [] Yes   [x] No - Send as HP to POD

## 2024-09-11 NOTE — TELEPHONE ENCOUNTER
Medication: albuterol (PROVENTIL HFA,VENTOLIN HFA) 90 mcg/act inhaler     Dose/Frequency: Inhale 2 puffs by mouth and into the lungs every 6 hours if needed for wheezing     Quantity: 3     Pharmacy: [facility] [address] [place], [place] [address] [place] [address]     Office:   [x] PCP/Provider -   [] Speciality/Provider -     Does the patient have enough for 3 days?   [] Yes   [x] No - Send as HP to POD

## 2024-09-11 NOTE — TELEPHONE ENCOUNTER
Medication: Fluticasone-Salmeterol (Advair) 100-50 mcg/dose inhaler     Dose/Frequency: Inhale 1 puff by mouth into the lungs twice a day. Rinse mouth after use.      Quantity: 3    Pharmacy: [facility] [address] [place] [address] [place] [address]     Office:   [x] PCP/Provider -   [] Speciality/Provider -     Does the patient have enough for 3 days?   [] Yes   [x] No - Send as HP to POD

## 2024-09-17 ENCOUNTER — OFFICE VISIT (OUTPATIENT)
Dept: FAMILY MEDICINE CLINIC | Facility: CLINIC | Age: 59
End: 2024-09-17
Payer: COMMERCIAL

## 2024-09-17 VITALS
HEART RATE: 80 BPM | BODY MASS INDEX: 33.47 KG/M2 | SYSTOLIC BLOOD PRESSURE: 140 MMHG | DIASTOLIC BLOOD PRESSURE: 80 MMHG | HEIGHT: 59 IN | OXYGEN SATURATION: 97 % | WEIGHT: 166 LBS | TEMPERATURE: 96.3 F | RESPIRATION RATE: 17 BRPM

## 2024-09-17 DIAGNOSIS — Z01.419 ENCOUNTER FOR ROUTINE GYNECOLOGICAL EXAMINATION WITH PAPANICOLAOU SMEAR OF CERVIX: Primary | ICD-10-CM

## 2024-09-17 PROCEDURE — G0145 SCR C/V CYTO,THINLAYER,RESCR: HCPCS | Performed by: NURSE PRACTITIONER

## 2024-09-17 PROCEDURE — S0612 ANNUAL GYNECOLOGICAL EXAMINA: HCPCS | Performed by: NURSE PRACTITIONER

## 2024-09-17 PROCEDURE — S0613 ANN BREAST EXAM: HCPCS | Performed by: NURSE PRACTITIONER

## 2024-09-17 NOTE — PROGRESS NOTES
"Subjective     Susie Peña is a 59 y.o. female here for a routine exam.  Current complaints: none.  Personal health questionnaire reviewed: yes.     Gynecologic History  No LMP recorded. Patient is postmenopausal.  Contraception: none  Last Pap: 2017. Results were: normal  Last mammogram: 2014. Results were: normal    Obstetric History  OB History   No obstetric history on file.         The following portions of the patient's history were reviewed and updated as appropriate: allergies, current medications, past family history, past medical history, past social history, past surgical history, and problem list.    Review of Systems  Constitutional: negative  Cardiovascular: negative  Gastrointestinal: negative  Genitourinary:negative  Integument/breast: negative  Hematologic/lymphatic: negative  Behavioral/Psych: negative  Endocrine: negative      Objective     /80 (BP Location: Left arm, Patient Position: Sitting, Cuff Size: Standard)   Pulse 80   Temp (!) 96.3 °F (35.7 °C) (Tympanic)   Resp 17   Ht 4' 11.21\" (1.504 m)   Wt 75.3 kg (166 lb)   SpO2 97%   BMI 33.29 kg/m²   General appearance: alert and oriented, in no acute distress and cooperative  Breasts: normal appearance, no masses or tenderness  Abdomen: soft, non-tender; bowel sounds normal; no masses,  no organomegaly  Pelvic: external genitalia normal, vagina normal without discharge, uterus normal size, shape, and consistency, no cervical motion tenderness, cervix normal in appearance, no adnexal masses or tenderness, rectovaginal septum normal, and urethral meatus  Rectal: normal tone, no masses or tenderness  Skin: Skin color, texture, turgor normal. No rashes or lesions  Lymph nodes: Cervical, supraclavicular, and axillary nodes normal.      Assessment     Healthy female exam.    Susie was seen today for gynecologic exam.    Diagnoses and all orders for this visit:    Encounter for routine gynecological examination with Papanicolaou " smear of cervix  -     Liquid-based pap, screening; Future  -     Liquid-based pap, screening  -     Molecular Hold Sample       Plan     Education reviewed: calcium supplements, depression evaluation, low fat, low cholesterol diet, self breast exams, skin cancer screening, and weight bearing exercise.  Follow up in: 3 years.

## 2024-09-20 LAB
LAB AP GYN PRIMARY INTERPRETATION: NORMAL
Lab: NORMAL

## 2024-12-04 ENCOUNTER — TELEPHONE (OUTPATIENT)
Age: 59
End: 2024-12-04

## 2024-12-05 DIAGNOSIS — F43.21 SITUATIONAL DEPRESSION: ICD-10-CM

## 2024-12-05 RX ORDER — SERTRALINE HYDROCHLORIDE 100 MG/1
150 TABLET, FILM COATED ORAL DAILY
Qty: 45 TABLET | Refills: 5 | Status: SHIPPED | OUTPATIENT
Start: 2024-12-05

## 2025-01-07 DIAGNOSIS — F43.21 SITUATIONAL DEPRESSION: ICD-10-CM

## 2025-01-07 DIAGNOSIS — J06.9 VIRAL URI WITH COUGH: ICD-10-CM

## 2025-01-07 DIAGNOSIS — J45.41 MODERATE PERSISTENT ASTHMA WITH ACUTE EXACERBATION: ICD-10-CM

## 2025-01-07 DIAGNOSIS — R05.1 ACUTE COUGH: ICD-10-CM

## 2025-01-07 RX ORDER — SERTRALINE HYDROCHLORIDE 100 MG/1
150 TABLET, FILM COATED ORAL DAILY
Qty: 45 TABLET | Refills: 5 | Status: SHIPPED | OUTPATIENT
Start: 2025-01-07

## 2025-01-07 RX ORDER — ALBUTEROL SULFATE 90 UG/1
INHALANT RESPIRATORY (INHALATION)
Qty: 8.5 G | Refills: 0 | Status: SHIPPED | OUTPATIENT
Start: 2025-01-07

## 2025-01-07 NOTE — TELEPHONE ENCOUNTER
Reason for call:   [x] Refill   [] Prior Auth  [x] Other: Not a Duplicate Change of Pharmacy    Office:   [x] PCP/Provider - JASON HOPKINS  - GABINO Bundy   [] Specialty/Provider -     Medication: albuterol (PROVENTIL HFA,VENTOLIN HFA) 90 mcg/act inhaler     Dose/Frequency:  Inhale 2 puffs by mouth and into the lungs every 6 hours if needed for wheezing     Quantity: 8.5 g      Medication: sertraline (ZOLOFT) 100 mg tablet     Dose/Frequency: Take 1.5 tablets (150 mg total) by mouth daily     Quantity: 45 tablet     Pharmacy: [id number] - [place], [place] - [address] [facility] [phone]    Does the patient have enough for 3 days?   [] Yes   [x] No - Send as HP to POD

## 2025-02-03 ENCOUNTER — APPOINTMENT (OUTPATIENT)
Dept: LAB | Facility: CLINIC | Age: 60
End: 2025-02-03
Payer: COMMERCIAL

## 2025-02-03 DIAGNOSIS — F51.01 PRIMARY INSOMNIA: ICD-10-CM

## 2025-02-03 DIAGNOSIS — K21.9 GASTROESOPHAGEAL REFLUX DISEASE WITHOUT ESOPHAGITIS: ICD-10-CM

## 2025-02-03 DIAGNOSIS — E78.2 MIXED HYPERLIPIDEMIA: ICD-10-CM

## 2025-02-03 DIAGNOSIS — R73.01 IFG (IMPAIRED FASTING GLUCOSE): ICD-10-CM

## 2025-02-03 LAB
ALBUMIN SERPL BCG-MCNC: 4.7 G/DL (ref 3.5–5)
ALP SERPL-CCNC: 104 U/L (ref 34–104)
ALT SERPL W P-5'-P-CCNC: 30 U/L (ref 7–52)
ANION GAP SERPL CALCULATED.3IONS-SCNC: 8 MMOL/L (ref 4–13)
AST SERPL W P-5'-P-CCNC: 24 U/L (ref 13–39)
BASOPHILS # BLD AUTO: 0.07 THOUSANDS/ΜL (ref 0–0.1)
BASOPHILS NFR BLD AUTO: 2 % (ref 0–1)
BILIRUB SERPL-MCNC: 0.48 MG/DL (ref 0.2–1)
BUN SERPL-MCNC: 10 MG/DL (ref 5–25)
CALCIUM SERPL-MCNC: 9.8 MG/DL (ref 8.4–10.2)
CHLORIDE SERPL-SCNC: 104 MMOL/L (ref 96–108)
CHOLEST SERPL-MCNC: 260 MG/DL (ref ?–200)
CO2 SERPL-SCNC: 28 MMOL/L (ref 21–32)
CREAT SERPL-MCNC: 0.79 MG/DL (ref 0.6–1.3)
EOSINOPHIL # BLD AUTO: 0.17 THOUSAND/ΜL (ref 0–0.61)
EOSINOPHIL NFR BLD AUTO: 4 % (ref 0–6)
ERYTHROCYTE [DISTWIDTH] IN BLOOD BY AUTOMATED COUNT: 14.3 % (ref 11.6–15.1)
GFR SERPL CREATININE-BSD FRML MDRD: 81 ML/MIN/1.73SQ M
GLUCOSE P FAST SERPL-MCNC: 106 MG/DL (ref 65–99)
HCT VFR BLD AUTO: 41.3 % (ref 34.8–46.1)
HDLC SERPL-MCNC: 51 MG/DL
HGB BLD-MCNC: 13.1 G/DL (ref 11.5–15.4)
IMM GRANULOCYTES # BLD AUTO: 0.02 THOUSAND/UL (ref 0–0.2)
IMM GRANULOCYTES NFR BLD AUTO: 0 % (ref 0–2)
LDLC SERPL CALC-MCNC: 188 MG/DL (ref 0–100)
LYMPHOCYTES # BLD AUTO: 1.4 THOUSANDS/ΜL (ref 0.6–4.47)
LYMPHOCYTES NFR BLD AUTO: 30 % (ref 14–44)
MCH RBC QN AUTO: 27.3 PG (ref 26.8–34.3)
MCHC RBC AUTO-ENTMCNC: 31.7 G/DL (ref 31.4–37.4)
MCV RBC AUTO: 86 FL (ref 82–98)
MONOCYTES # BLD AUTO: 0.75 THOUSAND/ΜL (ref 0.17–1.22)
MONOCYTES NFR BLD AUTO: 16 % (ref 4–12)
NEUTROPHILS # BLD AUTO: 2.19 THOUSANDS/ΜL (ref 1.85–7.62)
NEUTS SEG NFR BLD AUTO: 48 % (ref 43–75)
NRBC BLD AUTO-RTO: 0 /100 WBCS
PLATELET # BLD AUTO: 280 THOUSANDS/UL (ref 149–390)
PMV BLD AUTO: 11.8 FL (ref 8.9–12.7)
POTASSIUM SERPL-SCNC: 3.5 MMOL/L (ref 3.5–5.3)
PROT SERPL-MCNC: 7.9 G/DL (ref 6.4–8.4)
RBC # BLD AUTO: 4.79 MILLION/UL (ref 3.81–5.12)
SODIUM SERPL-SCNC: 140 MMOL/L (ref 135–147)
TRIGL SERPL-MCNC: 105 MG/DL (ref ?–150)
TSH SERPL DL<=0.05 MIU/L-ACNC: 1.89 UIU/ML (ref 0.45–4.5)
WBC # BLD AUTO: 4.6 THOUSAND/UL (ref 4.31–10.16)

## 2025-02-03 PROCEDURE — 85025 COMPLETE CBC W/AUTO DIFF WBC: CPT

## 2025-02-03 PROCEDURE — 80061 LIPID PANEL: CPT

## 2025-02-03 PROCEDURE — 84443 ASSAY THYROID STIM HORMONE: CPT

## 2025-02-03 PROCEDURE — 80053 COMPREHEN METABOLIC PANEL: CPT

## 2025-02-03 PROCEDURE — 36415 COLL VENOUS BLD VENIPUNCTURE: CPT

## 2025-03-13 ENCOUNTER — RA CDI HCC (OUTPATIENT)
Dept: OTHER | Facility: HOSPITAL | Age: 60
End: 2025-03-13

## 2025-03-17 ENCOUNTER — OFFICE VISIT (OUTPATIENT)
Dept: FAMILY MEDICINE CLINIC | Facility: CLINIC | Age: 60
End: 2025-03-17
Payer: COMMERCIAL

## 2025-03-17 VITALS
SYSTOLIC BLOOD PRESSURE: 126 MMHG | HEART RATE: 63 BPM | RESPIRATION RATE: 17 BRPM | OXYGEN SATURATION: 97 % | HEIGHT: 59 IN | TEMPERATURE: 96.9 F | WEIGHT: 161 LBS | DIASTOLIC BLOOD PRESSURE: 84 MMHG | BODY MASS INDEX: 32.46 KG/M2

## 2025-03-17 DIAGNOSIS — Z12.11 COLON CANCER SCREENING: ICD-10-CM

## 2025-03-17 DIAGNOSIS — E78.2 MIXED HYPERLIPIDEMIA: ICD-10-CM

## 2025-03-17 DIAGNOSIS — F32.2 SEVERE MAJOR DEPRESSIVE DISORDER (HCC): ICD-10-CM

## 2025-03-17 DIAGNOSIS — M25.561 CHRONIC PAIN OF RIGHT KNEE: ICD-10-CM

## 2025-03-17 DIAGNOSIS — Z12.31 ENCOUNTER FOR SCREENING MAMMOGRAM FOR BREAST CANCER: ICD-10-CM

## 2025-03-17 DIAGNOSIS — G89.29 CHRONIC PAIN OF RIGHT KNEE: ICD-10-CM

## 2025-03-17 DIAGNOSIS — J45.20 MILD INTERMITTENT ASTHMA WITHOUT COMPLICATION: Primary | ICD-10-CM

## 2025-03-17 PROBLEM — F33.42 RECURRENT MAJOR DEPRESSIVE DISORDER, IN FULL REMISSION (HCC): Status: RESOLVED | Noted: 2020-06-08 | Resolved: 2025-03-17

## 2025-03-17 PROCEDURE — 99214 OFFICE O/P EST MOD 30 MIN: CPT | Performed by: NURSE PRACTITIONER

## 2025-03-17 RX ORDER — SERTRALINE HYDROCHLORIDE 100 MG/1
150 TABLET, FILM COATED ORAL DAILY
Qty: 45 TABLET | Refills: 5 | Status: SHIPPED | OUTPATIENT
Start: 2025-03-17 | End: 2025-03-18 | Stop reason: SDUPTHER

## 2025-03-17 RX ORDER — VILAZODONE HYDROCHLORIDE 20 MG/1
40 TABLET ORAL
Qty: 90 TABLET | Refills: 1 | Status: SHIPPED | OUTPATIENT
Start: 2025-03-17 | End: 2025-03-17 | Stop reason: SDUPTHER

## 2025-03-17 RX ORDER — VILAZODONE HYDROCHLORIDE 20 MG/1
20 TABLET ORAL
Qty: 30 TABLET | Refills: 5 | Status: SHIPPED | OUTPATIENT
Start: 2025-03-17

## 2025-03-17 RX ORDER — ROSUVASTATIN CALCIUM 20 MG/1
20 TABLET, COATED ORAL DAILY
Qty: 100 TABLET | Refills: 3 | Status: SHIPPED | OUTPATIENT
Start: 2025-03-17

## 2025-03-17 RX ORDER — VILAZODONE HYDROCHLORIDE 20 MG/1
20 TABLET ORAL
Qty: 90 TABLET | Refills: 1 | Status: SHIPPED | OUTPATIENT
Start: 2025-03-17 | End: 2025-03-17 | Stop reason: SDUPTHER

## 2025-03-17 RX ORDER — FLUTICASONE PROPIONATE AND SALMETEROL 250; 50 UG/1; UG/1
1 POWDER RESPIRATORY (INHALATION) 2 TIMES DAILY
Qty: 180 BLISTER | Refills: 3 | Status: SHIPPED | OUTPATIENT
Start: 2025-03-17 | End: 2026-03-12

## 2025-03-17 NOTE — ASSESSMENT & PLAN NOTE
Increase advair to 250    Orders:    Fluticasone-Salmeterol (Advair) 250-50 mcg/dose inhaler; Inhale 1 puff 2 (two) times a day Rinse mouth after use.

## 2025-03-17 NOTE — PROGRESS NOTES
Name: Susie Peña      : 1965      MRN: [id number]  Encounter Provider: GABINO Bundy  Encounter Date: 3/17/2025   Encounter department: [facility]    Assessment & Plan  Mild intermittent asthma without complication  Increase advair to 250    Orders:    Fluticasone-Salmeterol (Advair) 250-50 mcg/dose inhaler; Inhale 1 puff 2 (two) times a day Rinse mouth after use.    Chronic pain of right knee  Xray knee, may need to see ortho    Orders:    XR knee 3 vw right non injury; Future    Mixed hyperlipidemia  Start crestor  Repeat labs    Orders:    rosuvastatin (CRESTOR) 20 MG tablet; Take 1 tablet (20 mg total) by mouth daily    Comprehensive metabolic panel; Future    Lipid Panel with Direct LDL reflex; Future    Severe major depressive disorder (HCC)  Depression Screening Follow-up Plan: Patient's depression screening was positive with a PHQ-9 score of 22.     Orders:    vilazodone (VIIBRYD) 20 mg tablet; Take 1 tablet (20 mg total) by mouth daily with breakfast    sertraline (ZOLOFT) 100 mg tablet; Take 1.5 tablets (150 mg total) by mouth daily    Colon cancer screening    Orders:    Ambulatory Referral to Gastroenterology; Future    Encounter for screening mammogram for breast cancer    Orders:    Mammo screening bilateral w 3d and cad; Future      Depression Screening and Follow-up Plan: Patient's depression screening was positive with a PHQ-9 score of 22.   Patient assessed for underlying major depression. Brief counseling provided and recommend additional follow-up/re-evaluation next office visit. Patient with underlying depression and was advised to continue current medications as prescribed.         History of Present Illness     Here for f/u  Inhalers are not helping with asthma  Feels sob  Depressed and zolot not helpin g at 150mg   No will power for her obesity and weight loss  Knees hurt, right worse than left  On advair, not working for asthma, on 100mg  daily          Review of Systems   Constitutional:  Positive for fatigue. Negative for fever.   HENT:  Negative for congestion, postnasal drip and rhinorrhea.    Eyes:  Negative for photophobia and visual disturbance.   Respiratory:  Positive for chest tightness, shortness of breath and wheezing.    Cardiovascular:  Negative for chest pain and palpitations.   Gastrointestinal:  Negative for constipation, diarrhea, nausea and vomiting.   Genitourinary:  Negative for dysuria and frequency.   Musculoskeletal:  Positive for arthralgias, joint swelling and myalgias.   Skin:  Negative for rash.   Neurological:  Negative for dizziness, light-headedness and headaches.   Hematological:  Negative for adenopathy.   Psychiatric/Behavioral:  Positive for dysphoric mood. Negative for sleep disturbance. The patient is nervous/anxious.      Past Medical History:   Diagnosis Date    Asthma     Dysfunction of eustachian tube     Exposure to STD     Last assessed: 2013    Lump in throat     Last assessed: 2014    Neck muscle spasm     Last assessed: 3/29/2013    Pregnancy     TV (tinea versicolor)     Last Assessed: 2013     Past Surgical History:   Procedure Laterality Date     SECTION      son     COLPOSCOPY VULVA W/ BIOPSY  2017    COLPO- AUBREY 1/ HPV    LASIK Bilateral     NEUROPLASTY / TRANSPOSITION MEDIAN NERVE AT CARPAL TUNNEL Left      Family History   Problem Relation Age of Onset    Heart attack Mother     Heart attack Father     Diabetes Sister     Hyperlipidemia Sister     Coronary artery disease Brother      Social History     Tobacco Use    Smoking status: Never    Smokeless tobacco: Never   Vaping Use    Vaping status: Never Used   Substance and Sexual Activity    Alcohol use: Yes     Comment: Being A Social Drinker     Drug use: Never    Sexual activity: Not on file     Current Outpatient Medications on File Prior to Visit   Medication Sig    albuterol (Proventil HFA) 90 mcg/act inhaler  "Inhale 2 puffs every 4 (four) hours as needed for wheezing    albuterol (PROVENTIL HFA,VENTOLIN HFA) 90 mcg/act inhaler Inhale 2 puffs by mouth and into the lungs every 6 hours if needed for wheezing    albuterol (2.5 mg/3 mL) 0.083 % nebulizer solution Take 3 mL (2.5 mg total) by nebulization every 6 (six) hours as needed for wheezing or shortness of breath (Patient not taking: Reported on 3/17/2025)     Allergies   Allergen Reactions    Pollen Extract      Immunization History   Administered Date(s) Administered    COVID-19 PFIZER VACCINE 0.3 ML IM 04/29/2021, 05/21/2021    Influenza, seasonal, injectable 09/01/2013    Tdap 05/23/2013     Objective   /84 (BP Location: Left arm, Patient Position: Sitting, Cuff Size: Standard)   Pulse 63   Temp (!) 96.9 °F (36.1 °C) (Tympanic)   Resp 17   Ht 4' 11.21\" (1.504 m)   Wt 73 kg (161 lb)   SpO2 97%   BMI 32.29 kg/m²     Physical Exam  Vitals and nursing note reviewed.   Constitutional:       Appearance: Normal appearance.   HENT:      Head: Normocephalic and atraumatic.      Right Ear: Tympanic membrane, ear canal and external ear normal.      Left Ear: Tympanic membrane, ear canal and external ear normal.      Nose: Nose normal.      Mouth/Throat:      Mouth: Mucous membranes are moist.   Eyes:      Conjunctiva/sclera: Conjunctivae normal.   Cardiovascular:      Rate and Rhythm: Normal rate and regular rhythm.      Heart sounds: Normal heart sounds.   Pulmonary:      Effort: Pulmonary effort is normal.      Breath sounds: Normal breath sounds.   Abdominal:      General: Bowel sounds are normal.      Palpations: Abdomen is soft.   Musculoskeletal:         General: Normal range of motion.      Cervical back: Normal range of motion and neck supple.   Skin:     General: Skin is warm and dry.      Capillary Refill: Capillary refill takes less than 2 seconds.   Neurological:      General: No focal deficit present.      Mental Status: She is alert and oriented to " person, place, and time.   Psychiatric:         Mood and Affect: Mood normal.         Behavior: Behavior normal.     BMI Counseling: Body mass index is 32.29 kg/m². The BMI is above normal. Nutrition recommendations include reducing portion sizes, decreasing overall calorie intake, 3-5 servings of fruits/vegetables daily, reducing fast food intake, consuming healthier snacks, decreasing soda and/or juice intake, moderation in carbohydrate intake, increasing intake of lean protein, reducing intake of saturated fat and trans fat, and reducing intake of cholesterol. Exercise recommendations include moderate aerobic physical activity for 150 minutes/week, exercising 3-5 times per week, and strength training exercises.

## 2025-03-17 NOTE — ASSESSMENT & PLAN NOTE
Start crestor  Repeat labs    Orders:    rosuvastatin (CRESTOR) 20 MG tablet; Take 1 tablet (20 mg total) by mouth daily    Comprehensive metabolic panel; Future    Lipid Panel with Direct LDL reflex; Future

## 2025-03-17 NOTE — ASSESSMENT & PLAN NOTE
Depression Screening Follow-up Plan: Patient's depression screening was positive with a PHQ-9 score of 22.     Orders:    vilazodone (VIIBRYD) 20 mg tablet; Take 1 tablet (20 mg total) by mouth daily with breakfast    sertraline (ZOLOFT) 100 mg tablet; Take 1.5 tablets (150 mg total) by mouth daily

## 2025-03-18 PROBLEM — G89.29 CHRONIC PAIN OF RIGHT KNEE: Status: ACTIVE | Noted: 2025-03-18

## 2025-03-18 PROBLEM — M25.561 CHRONIC PAIN OF RIGHT KNEE: Status: ACTIVE | Noted: 2025-03-18

## 2025-03-18 RX ORDER — SERTRALINE HYDROCHLORIDE 100 MG/1
150 TABLET, FILM COATED ORAL DAILY
Qty: 45 TABLET | Refills: 5 | Status: SHIPPED | OUTPATIENT
Start: 2025-03-18

## 2025-03-25 ENCOUNTER — HOSPITAL ENCOUNTER (OUTPATIENT)
Dept: RADIOLOGY | Facility: HOSPITAL | Age: 60
Discharge: HOME | End: 2025-03-25
Payer: COMMERCIAL

## 2025-03-25 DIAGNOSIS — M25.561 CHRONIC PAIN OF RIGHT KNEE: ICD-10-CM

## 2025-03-25 DIAGNOSIS — G89.29 CHRONIC PAIN OF RIGHT KNEE: ICD-10-CM

## 2025-03-25 PROCEDURE — 73562 X-RAY EXAM OF KNEE 3: CPT

## 2025-03-26 ENCOUNTER — RESULTS FOLLOW-UP (OUTPATIENT)
Dept: FAMILY MEDICINE CLINIC | Facility: CLINIC | Age: 60
End: 2025-03-26

## 2025-04-18 ENCOUNTER — OFFICE VISIT (OUTPATIENT)
Dept: OBGYN CLINIC | Facility: CLINIC | Age: 60
End: 2025-04-18
Payer: COMMERCIAL

## 2025-04-18 VITALS — BODY MASS INDEX: 32.62 KG/M2 | HEIGHT: 59 IN | WEIGHT: 161.8 LBS

## 2025-04-18 DIAGNOSIS — J45.41 MODERATE PERSISTENT ASTHMA WITH ACUTE EXACERBATION: ICD-10-CM

## 2025-04-18 DIAGNOSIS — J06.9 VIRAL URI WITH COUGH: ICD-10-CM

## 2025-04-18 DIAGNOSIS — R05.1 ACUTE COUGH: ICD-10-CM

## 2025-04-18 DIAGNOSIS — M17.11 PATELLOFEMORAL ARTHRITIS OF RIGHT KNEE: Primary | ICD-10-CM

## 2025-04-18 PROCEDURE — 99213 OFFICE O/P EST LOW 20 MIN: CPT | Performed by: STUDENT IN AN ORGANIZED HEALTH CARE EDUCATION/TRAINING PROGRAM

## 2025-04-18 RX ORDER — OMEPRAZOLE 20 MG/1
20 CAPSULE, DELAYED RELEASE ORAL DAILY
COMMUNITY

## 2025-04-18 RX ORDER — ALBUTEROL SULFATE 90 UG/1
INHALANT RESPIRATORY (INHALATION)
Qty: 8.5 G | Refills: 1 | Status: SHIPPED | OUTPATIENT
Start: 2025-04-18

## 2025-04-18 RX ORDER — MELOXICAM 15 MG/1
15 TABLET ORAL DAILY
Qty: 30 TABLET | Refills: 2 | Status: SHIPPED | OUTPATIENT
Start: 2025-04-18 | End: 2025-07-17

## 2025-04-18 NOTE — ASSESSMENT & PLAN NOTE
Orders:    Ambulatory Referral to Physical Therapy; Future    meloxicam (Mobic) 15 mg tablet; Take 1 tablet (15 mg total) by mouth daily

## 2025-04-18 NOTE — PROGRESS NOTES
Initial Office Visit    Assessment:   Susie Peña is a 60 y.o. female with patellofemoral arthritis of the right knee and mild osteoarthritis of the medial compartment of the right knee.     Plan:    Discussed x ray and physical exam findings of the right knee at length with patient in the office today. Discussed physical exam findings consistent with osteoarthritis of the medial and patellofemoral compartment of the right knee. Discussed conservative treatment with activity modification, RICE therapies, and oral anti-inflammatories as needed for persistent pain and inflammation. Also discussed physical therapy to work on strengthening with core and hip exercises to help support the knee. Patient expressed understanding. Referral placed to PT today. Also discussed Meloxicam for persistent pain and inflammation of the right knee. Discussed side effects and risks of medication. Patient agreeable. Prescription and instruction for oral use provided to the patient today. She will follow up in 6 weeks for reevaluation , can consider steroid injection for persistent pain and inflammation. All of patients questions were answered in the office today. Patient encouraged to reach out via [employer] for further questions or concerns.         Conservative treatment:    PT for ROM/strengthening to knee, hip and core.  Prescription NSAIDS for pain (Meloxicam).    Imaging:    All imaging from today was reviewed by myself and explained to the patient.       Injection:    No Injection planned at this time.      Surgery:     No surgery is recommended at this point, continue with conservative treatment plan as noted.      Follow up:    Return in about 6 weeks (around 5/30/2025). without x-rays    Assessment & Plan  Patellofemoral arthritis of right knee    Orders:    Ambulatory Referral to Physical Therapy; Future    meloxicam (Mobic) 15 mg tablet; Take 1 tablet (15 mg total) by mouth daily      Chief Complaint: rightknee pain,  swelling, limited motion     History of Present Illness:   Susie Peña is a 60 y.o. female who presents to the office for evaluation of her right knees. she has had right knee pain for about 3 months. She states she is a  and has had persistent anterior right knee pain for the last 3 months particularly with hyperflexion and going down steps. She denies previous surgery, injury, or injections of the right knee. She denies use of oral anti-inflammatories or formal physical therapy. She denies numbness and tingling of the right lower extremity.     Pain Assessment:  Character:  Aching   Location: Right knee  Duration: 3 months   Associated Symptoms:  Knee swelling and instability    PMHx:   Past Medical History:   Diagnosis Date    Asthma     Dysfunction of eustachian tube     Exposure to STD     Last assessed: 2013    Lump in throat     Last assessed: 2014    Neck muscle spasm     Last assessed: 3/29/2013    Pregnancy     TV (tinea versicolor)     Last Assessed: 2013     PSHx:   Past Surgical History:   Procedure Laterality Date     SECTION      son     COLPOSCOPY VULVA W/ BIOPSY  2017    COLPO- AUBREY 1/ HPV    LASIK Bilateral     NEUROPLASTY / TRANSPOSITION MEDIAN NERVE AT CARPAL TUNNEL Left      Medications:   Current Outpatient Medications on File Prior to Visit   Medication Sig Dispense Refill    albuterol (Proventil HFA) 90 mcg/act inhaler Inhale 2 puffs every 4 (four) hours as needed for wheezing 6.7 g 0    albuterol (PROVENTIL HFA,VENTOLIN HFA) 90 mcg/act inhaler Inhale 2 puffs by mouth and into the lungs every 6 hours if needed for wheezing 8.5 g 0    Fluticasone-Salmeterol (Advair) 250-50 mcg/dose inhaler Inhale 1 puff 2 (two) times a day Rinse mouth after use. 180 blister 3    omeprazole (PriLOSEC) 20 mg delayed release capsule Take 20 mg by mouth daily      rosuvastatin (CRESTOR) 20 MG tablet Take 1 tablet (20 mg total) by mouth daily 100 tablet 3    sertraline  (ZOLOFT) 100 mg tablet Take 1.5 tablets (150 mg total) by mouth daily 45 tablet 5    vilazodone (VIIBRYD) 20 mg tablet Take 1 tablet (20 mg total) by mouth daily with breakfast 30 tablet 5    albuterol (2.5 mg/3 mL) 0.083 % nebulizer solution Take 3 mL (2.5 mg total) by nebulization every 6 (six) hours as needed for wheezing or shortness of breath (Patient not taking: Reported on 4/18/2025) 120 mL 0     No current facility-administered medications on file prior to visit.     Allergies:   Allergies   Allergen Reactions    Pollen Extract      Family History:   Family History   Problem Relation Age of Onset    Heart attack Mother     Heart attack Father     Diabetes Sister     Hyperlipidemia Sister     Coronary artery disease Brother      Review of systems: ROS is negative other than that noted in the HPI.  Constitutional: Negative for fatigue and fever.   HENT: Negative for sore throat.    Respiratory: Negative for shortness of breath.    Cardiovascular: Negative for chest pain.   Gastrointestinal: Negative for abdominal pain.   Endocrine: Negative for cold intolerance and heat intolerance.   Genitourinary: Negative for flank pain.   Musculoskeletal: Negative for back pain.   Skin: Negative for rash.   Allergic/Immunologic: Negative for immunocompromised state.   Neurological: Negative for dizziness.   Psychiatric/Behavioral: Negative for agitation.     Comprehensive Physical Examination:  There were no vitals filed for this visit.    General Appearance: The patient is a well developed, well nourished female  in no apparent distress.  Orientation:  The patient is alert and interactive.  Oriented to time, place and person.  Mood and Affect:  The patient has normal mood and affect.  Gait and Station: she is noted to ambulate with a mildly antalgic gait favoring the left lower extremity.  Observed standing alignment demonstrates varus alignment of her right knee present.      Knee Exam (focused):                RIGHT  LEFT   ROM:   0-130 0-130   Crepitus  Positive Negative   Palpation: Effusion mild negative     MJL tenderness Positive Negative     LJL tenderness Positive Negative   Meniscus: Flaco Negative Negative    Apley's Compression Negative Negative   Instability: Varus at 0 + 30 stable stable     Valgus at 0 + 30 stable stable   Special Tests: Lachman Negative Negative     Posterior drawer Negative Negative     Anterior drawer Negative Negative     Pivot shift not tested not tested     Dial not tested not tested   Patella: Grind test Positive Negative     Mobility 1/4 1/4     Apprehension Negative Negative   Other: Single leg 1/4 squat not tested not tested      LE NV Exam: +2 DP/PT pulses bilaterally  Sensation intact to light touch L2-S1 bilaterally     Bilateral hip ROM demonstrates no pain actively or passively    No calf tenderness to palpation bilaterally      Radiographic Imaging:   I have personally reviewed and interpreted 4 radiographs of right knee which were completed 03/25/2025 and were reviewed in detail with the patient. These demonstrate varus osteoarthritis of both knees with Medial compartment joint space narrowing, subchondral sclerosis and peripheral osteophytes present. There is also evidence of patellofemoral comparment disease present. No acute fracture or dislocation.       Scribe Attestation      I,:  Franny Vasquez PA-C am acting as a scribe while in the presence of the attending physician.:       I,:  Alf Morse MD personally performed the services described in this documentation    as scribed in my presence.:

## 2025-05-08 ENCOUNTER — TELEPHONE (OUTPATIENT)
Age: 60
End: 2025-05-08

## 2025-05-08 DIAGNOSIS — E78.2 MIXED HYPERLIPIDEMIA: Primary | ICD-10-CM

## 2025-05-08 NOTE — TELEPHONE ENCOUNTER
"Spoke to Aleisha, upon review of patients chart, aleisha stated patient does not need to have her labs completed until June as it is to soon, also stated pt does not need to keep tomorrows apt, she can just follow up at her June apt.     \" Patient was not happy stated she fasted all day and now she is going to have to fast again\"     I did apologize, but she was still unhappy  "

## 2025-05-08 NOTE — TELEPHONE ENCOUNTER
Patient calling from  Lab to have her Lipid panel and CMP drawn.  Expected date on lab slip is for on or after June 17th.  Patient has appointment tomorrow with Aleisha to review these labs.    Called office clinical and spoke with Ivania to see if labs could be changed.  Ivania will check with Aleisha and call patient back.  Relayed message to patient and she expressed understanding.

## 2025-06-04 DIAGNOSIS — J06.9 VIRAL URI WITH COUGH: ICD-10-CM

## 2025-06-04 DIAGNOSIS — J45.41 MODERATE PERSISTENT ASTHMA WITH ACUTE EXACERBATION: ICD-10-CM

## 2025-06-04 DIAGNOSIS — R05.1 ACUTE COUGH: ICD-10-CM

## 2025-06-05 RX ORDER — ALBUTEROL SULFATE 90 UG/1
INHALANT RESPIRATORY (INHALATION)
Qty: 8.5 G | Refills: 3 | Status: SHIPPED | OUTPATIENT
Start: 2025-06-05

## 2025-06-14 ENCOUNTER — APPOINTMENT (OUTPATIENT)
Dept: LAB | Facility: CLINIC | Age: 60
End: 2025-06-14
Payer: COMMERCIAL

## 2025-06-14 DIAGNOSIS — E78.2 MIXED HYPERLIPIDEMIA: ICD-10-CM

## 2025-06-14 LAB
ALBUMIN SERPL BCG-MCNC: 4.4 G/DL (ref 3.5–5)
ALP SERPL-CCNC: 99 U/L (ref 34–104)
ALT SERPL W P-5'-P-CCNC: 26 U/L (ref 7–52)
ANION GAP SERPL CALCULATED.3IONS-SCNC: 6 MMOL/L (ref 4–13)
AST SERPL W P-5'-P-CCNC: 20 U/L (ref 13–39)
BILIRUB SERPL-MCNC: 0.65 MG/DL (ref 0.2–1)
BUN SERPL-MCNC: 13 MG/DL (ref 5–25)
CALCIUM SERPL-MCNC: 9.3 MG/DL (ref 8.4–10.2)
CHLORIDE SERPL-SCNC: 106 MMOL/L (ref 96–108)
CHOLEST SERPL-MCNC: 137 MG/DL (ref ?–200)
CO2 SERPL-SCNC: 29 MMOL/L (ref 21–32)
CREAT SERPL-MCNC: 0.76 MG/DL (ref 0.6–1.3)
GFR SERPL CREATININE-BSD FRML MDRD: 84 ML/MIN/1.73SQ M
GLUCOSE P FAST SERPL-MCNC: 171 MG/DL (ref 65–99)
HDLC SERPL-MCNC: 54 MG/DL
LDLC SERPL CALC-MCNC: 66 MG/DL (ref 0–100)
POTASSIUM SERPL-SCNC: 4.1 MMOL/L (ref 3.5–5.3)
PROT SERPL-MCNC: 7.3 G/DL (ref 6.4–8.4)
SODIUM SERPL-SCNC: 141 MMOL/L (ref 135–147)
TRIGL SERPL-MCNC: 83 MG/DL (ref ?–150)

## 2025-06-14 PROCEDURE — 36415 COLL VENOUS BLD VENIPUNCTURE: CPT

## 2025-06-14 PROCEDURE — 80053 COMPREHEN METABOLIC PANEL: CPT

## 2025-06-14 PROCEDURE — 80061 LIPID PANEL: CPT

## 2025-06-16 ENCOUNTER — TELEPHONE (OUTPATIENT)
Dept: FAMILY MEDICINE CLINIC | Facility: CLINIC | Age: 60
End: 2025-06-16

## 2025-06-19 ENCOUNTER — TELEPHONE (OUTPATIENT)
Age: 60
End: 2025-06-19

## 2025-06-19 NOTE — TELEPHONE ENCOUNTER
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 Pt needs to now have her Hemoglobin A1c done. Pt states that she has also been gaining weight and fatigue. Would like to know if any other blood work can be order for those symptoms. Please advise.

## 2025-06-20 DIAGNOSIS — R73.9 ELEVATED BLOOD SUGAR: Primary | ICD-10-CM

## 2025-06-24 ENCOUNTER — APPOINTMENT (OUTPATIENT)
Dept: LAB | Facility: CLINIC | Age: 60
End: 2025-06-24
Payer: COMMERCIAL

## 2025-06-24 DIAGNOSIS — R73.9 ELEVATED BLOOD SUGAR: ICD-10-CM

## 2025-06-24 LAB
EST. AVERAGE GLUCOSE BLD GHB EST-MCNC: 209 MG/DL
HBA1C MFR BLD: 8.9 %

## 2025-06-24 PROCEDURE — 36415 COLL VENOUS BLD VENIPUNCTURE: CPT

## 2025-06-24 PROCEDURE — 83036 HEMOGLOBIN GLYCOSYLATED A1C: CPT

## 2025-07-01 ENCOUNTER — OFFICE VISIT (OUTPATIENT)
Dept: FAMILY MEDICINE CLINIC | Facility: CLINIC | Age: 60
End: 2025-07-01
Payer: COMMERCIAL

## 2025-07-01 VITALS
BODY MASS INDEX: 31.73 KG/M2 | HEART RATE: 74 BPM | SYSTOLIC BLOOD PRESSURE: 146 MMHG | HEIGHT: 59 IN | DIASTOLIC BLOOD PRESSURE: 70 MMHG | OXYGEN SATURATION: 98 % | WEIGHT: 157.4 LBS

## 2025-07-01 DIAGNOSIS — I10 HYPERTENSION, UNSPECIFIED TYPE: ICD-10-CM

## 2025-07-01 DIAGNOSIS — M54.6 ACUTE RIGHT-SIDED THORACIC BACK PAIN: ICD-10-CM

## 2025-07-01 DIAGNOSIS — E11.9 TYPE 2 DIABETES MELLITUS WITHOUT COMPLICATION, WITHOUT LONG-TERM CURRENT USE OF INSULIN (HCC): Primary | ICD-10-CM

## 2025-07-01 LAB
LEFT EYE DIABETIC RETINOPATHY: NORMAL
LEFT EYE IMAGE QUALITY: NORMAL
LEFT EYE MACULAR EDEMA: NORMAL
LEFT EYE OTHER RETINOPATHY: NORMAL
RIGHT EYE DIABETIC RETINOPATHY: NORMAL
RIGHT EYE IMAGE QUALITY: NORMAL
RIGHT EYE MACULAR EDEMA: NORMAL
RIGHT EYE OTHER RETINOPATHY: NORMAL
SEVERITY (EYE EXAM): NORMAL

## 2025-07-01 PROCEDURE — 99214 OFFICE O/P EST MOD 30 MIN: CPT | Performed by: NURSE PRACTITIONER

## 2025-07-01 RX ORDER — LISINOPRIL 5 MG/1
5 TABLET ORAL DAILY
Qty: 30 TABLET | Refills: 0 | Status: SHIPPED | OUTPATIENT
Start: 2025-07-01

## 2025-07-01 RX ORDER — GLIPIZIDE 2.5 MG/1
2.5 TABLET, EXTENDED RELEASE ORAL DAILY
Qty: 30 TABLET | Refills: 5 | Status: SHIPPED | OUTPATIENT
Start: 2025-07-01 | End: 2025-12-28

## 2025-07-01 NOTE — PROGRESS NOTES
Name: Susie Peña      : 1965      MRN: [id number]  Encounter Provider: GABINO Carey  Encounter Date: 2025   Encounter department: [facility]  :  Assessment & Plan  Type 2 diabetes mellitus without complication, without long-term current use of insulin (HCC)  Newly diagnosed with A1C 8.9.  discussed treatment options, including diet and exercise.  She does not want to take metformin because he worries that it caused a family member to have dementia.  She is already on statin, start lisinopril.  She is agreeable to start glipizide.  Jardiance estimate is over $1700.  She reports that she tries to avoid carbs already but she intends to also continue to work on diet and exercise.  IRIS and foot exam done in office.  Follow up 3 months with pcp and can repeat A1C in office.    Lab Results   Component Value Date    HGBA1C 8.9 (H) 2025       Orders:    glipiZIDE (GLUCOTROL XL) 2.5 mg 24 hr tablet; Take 1 tablet (2.5 mg total) by mouth daily    IRIS Diabetic eye exam    lisinopril (ZESTRIL) 5 mg tablet; Take 1 tablet (5 mg total) by mouth daily    Hypertension, unspecified type  Start lisinopril 5 mg.  Follow up 3 month with pcp.       Acute right-sided thoracic back pain  Mildly tender as marked below, she is already on meloxicam.  She does not want to go to PT because she does not believe it is helpful.  She does not want to try a muscle relaxer.  She plans to continue to monitor and call if needed.                History of Present Illness   Pt is a 60 y.o. female who is seen today for discussion of new onset diabetes.  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  Her A1C came back 8.9.  she feels her diet is low in carbs.  She walks at work regularly and takes walks on her sisters elliptical.  She report right back pain and RLQ pain x 2 weeks worse with movement.  No change in bowels,  "appetite is fine.  No nausea/vomiting.  She is putting heat on her back, this helps a bit.  She is taking meloxicam and wearing a back brace.        Review of Systems   Constitutional:  Negative for chills, fatigue and fever.   Eyes:  Negative for visual disturbance.   Respiratory:  Negative for shortness of breath.    Cardiovascular:  Negative for chest pain and palpitations.   Gastrointestinal:  Positive for abdominal pain.   Genitourinary:  Negative for difficulty urinating.   Musculoskeletal:  Positive for back pain.   Neurological:  Negative for dizziness, light-headedness and headaches.       Objective   /70 (BP Location: Left arm, Patient Position: Sitting, Cuff Size: Standard)   Pulse 74   Ht 4' 11.21\" (1.504 m)   Wt 71.4 kg (157 lb 6.4 oz)   SpO2 98%   BMI 31.57 kg/m²      Physical Exam  Vitals reviewed.   Constitutional:       General: She is awake. She is not in acute distress.     Appearance: Normal appearance. She is well-developed and well-groomed. She is not ill-appearing.     Eyes:      General: Lids are normal.      Conjunctiva/sclera: Conjunctivae normal.       Cardiovascular:      Rate and Rhythm: Normal rate and regular rhythm.      Heart sounds: Normal heart sounds. No murmur heard.  Pulmonary:      Effort: Pulmonary effort is normal. No tachypnea, accessory muscle usage or respiratory distress.      Breath sounds: Normal breath sounds.     Musculoskeletal:      Thoracic back: Spasms and tenderness present. No swelling, deformity or bony tenderness.        Back:      Neurological:      Mental Status: She is alert and oriented to person, place, and time.     Psychiatric:         Attention and Perception: Attention normal.         Mood and Affect: Mood normal.         Speech: Speech normal.         Behavior: Behavior normal. Behavior is cooperative.         Thought Content: Thought content normal.         Cognition and Memory: Cognition normal.         Judgment: Judgment normal. "         Answers submitted by the patient for this visit:  Annual Physical (Submitted on 6/30/2025)  Sleep choices: 4-6 hours of sleep on average  Hearing choices: normal hearing bilateral ears  Vision choices: wears glasses  Do you currently have an OB/GYN provider that you routinely follow with?: Yes  Do you have an advance directive/living will?: No  Do you have a durable power of  (POA)?: No

## 2025-07-15 ENCOUNTER — OFFICE VISIT (OUTPATIENT)
Dept: OBGYN CLINIC | Facility: CLINIC | Age: 60
End: 2025-07-15
Payer: COMMERCIAL

## 2025-07-15 ENCOUNTER — TELEPHONE (OUTPATIENT)
Dept: OTHER | Facility: OTHER | Age: 60
End: 2025-07-15

## 2025-07-15 VITALS — HEIGHT: 59 IN | WEIGHT: 157 LBS | BODY MASS INDEX: 31.65 KG/M2

## 2025-07-15 DIAGNOSIS — M17.11 PATELLOFEMORAL ARTHRITIS OF RIGHT KNEE: Primary | ICD-10-CM

## 2025-07-15 PROCEDURE — 99213 OFFICE O/P EST LOW 20 MIN: CPT | Performed by: STUDENT IN AN ORGANIZED HEALTH CARE EDUCATION/TRAINING PROGRAM

## 2025-07-16 NOTE — TELEPHONE ENCOUNTER
LM to call back.   
Noted.   
Patient returned call, message in chart reviewed and relayed (there is another encounter with messages from [employer] about this dated 06/21/25).  Patient is unable to print form online at [facility], she did say the form that we have already filled out scanned into chart is not the form she needs.  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  Transferred call to [facility] as per patient request.    MELANIE  
Statement Selected

## 2025-07-23 ENCOUNTER — TELEPHONE (OUTPATIENT)
Age: 60
End: 2025-07-23

## 2025-07-23 NOTE — TELEPHONE ENCOUNTER
Patient will be faxing over a diabetic waiver form and requests a call back once completed so she can .

## 2025-07-31 ENCOUNTER — CLINICAL SUPPORT (OUTPATIENT)
Dept: FAMILY MEDICINE CLINIC | Facility: CLINIC | Age: 60
End: 2025-07-31
Payer: COMMERCIAL

## 2025-07-31 VITALS — BODY MASS INDEX: 30.88 KG/M2 | WEIGHT: 154 LBS

## 2025-07-31 DIAGNOSIS — E11.9 TYPE 2 DIABETES MELLITUS WITHOUT COMPLICATION, WITHOUT LONG-TERM CURRENT USE OF INSULIN (HCC): Primary | ICD-10-CM

## 2025-07-31 LAB — SL AMB POCT HEMOGLOBIN AIC: 6.9 (ref ?–6.5)

## 2025-07-31 PROCEDURE — 83036 HEMOGLOBIN GLYCOSYLATED A1C: CPT

## 2025-08-07 ENCOUNTER — PATIENT MESSAGE (OUTPATIENT)
Dept: FAMILY MEDICINE CLINIC | Facility: CLINIC | Age: 60
End: 2025-08-07

## 2025-08-07 DIAGNOSIS — M17.11 PATELLOFEMORAL ARTHRITIS OF RIGHT KNEE: ICD-10-CM

## 2025-08-07 RX ORDER — MELOXICAM 15 MG/1
15 TABLET ORAL DAILY
Qty: 30 TABLET | Refills: 0 | Status: SHIPPED | OUTPATIENT
Start: 2025-08-07

## 2025-08-10 ENCOUNTER — PATIENT MESSAGE (OUTPATIENT)
Dept: FAMILY MEDICINE CLINIC | Facility: CLINIC | Age: 60
End: 2025-08-10

## 2025-08-14 ENCOUNTER — OFFICE VISIT (OUTPATIENT)
Dept: FAMILY MEDICINE CLINIC | Facility: CLINIC | Age: 60
End: 2025-08-14
Payer: COMMERCIAL

## 2025-08-14 PROBLEM — E11.9 TYPE 2 DIABETES MELLITUS WITHOUT COMPLICATION, WITHOUT LONG-TERM CURRENT USE OF INSULIN (HCC): Status: ACTIVE | Noted: 2025-08-14
